# Patient Record
Sex: MALE | Race: WHITE | Employment: OTHER | URBAN - METROPOLITAN AREA
[De-identification: names, ages, dates, MRNs, and addresses within clinical notes are randomized per-mention and may not be internally consistent; named-entity substitution may affect disease eponyms.]

---

## 2017-02-07 ENCOUNTER — OFFICE VISIT (OUTPATIENT)
Dept: FAMILY MEDICINE CLINIC | Age: 72
End: 2017-02-07

## 2017-02-07 VITALS
HEART RATE: 86 BPM | BODY MASS INDEX: 35.56 KG/M2 | OXYGEN SATURATION: 95 % | SYSTOLIC BLOOD PRESSURE: 126 MMHG | TEMPERATURE: 98 F | DIASTOLIC BLOOD PRESSURE: 78 MMHG | WEIGHT: 254 LBS | HEIGHT: 71 IN

## 2017-02-07 DIAGNOSIS — M25.562 ACUTE PAIN OF LEFT KNEE: ICD-10-CM

## 2017-02-07 DIAGNOSIS — G89.29 CHRONIC MIDLINE LOW BACK PAIN WITHOUT SCIATICA: ICD-10-CM

## 2017-02-07 DIAGNOSIS — G25.0 BENIGN ESSENTIAL TREMOR: ICD-10-CM

## 2017-02-07 DIAGNOSIS — M54.50 CHRONIC MIDLINE LOW BACK PAIN WITHOUT SCIATICA: ICD-10-CM

## 2017-02-07 DIAGNOSIS — Z12.11 COLON CANCER SCREENING: ICD-10-CM

## 2017-02-07 DIAGNOSIS — I10 BENIGN ESSENTIAL HYPERTENSION: Primary | ICD-10-CM

## 2017-02-07 PROCEDURE — 1036F TOBACCO NON-USER: CPT | Performed by: FAMILY MEDICINE

## 2017-02-07 PROCEDURE — 3017F COLORECTAL CA SCREEN DOC REV: CPT | Performed by: FAMILY MEDICINE

## 2017-02-07 PROCEDURE — G8419 CALC BMI OUT NRM PARAM NOF/U: HCPCS | Performed by: FAMILY MEDICINE

## 2017-02-07 PROCEDURE — G8482 FLU IMMUNIZE ORDER/ADMIN: HCPCS | Performed by: FAMILY MEDICINE

## 2017-02-07 PROCEDURE — G8427 DOCREV CUR MEDS BY ELIG CLIN: HCPCS | Performed by: FAMILY MEDICINE

## 2017-02-07 PROCEDURE — 99214 OFFICE O/P EST MOD 30 MIN: CPT | Performed by: FAMILY MEDICINE

## 2017-02-07 PROCEDURE — 4040F PNEUMOC VAC/ADMIN/RCVD: CPT | Performed by: FAMILY MEDICINE

## 2017-02-07 PROCEDURE — 1123F ACP DISCUSS/DSCN MKR DOCD: CPT | Performed by: FAMILY MEDICINE

## 2017-02-20 RX ORDER — ATENOLOL 50 MG/1
50 TABLET ORAL DAILY
Qty: 30 TABLET | Refills: 3 | Status: SHIPPED | OUTPATIENT
Start: 2017-02-20 | End: 2017-04-10 | Stop reason: DRUGHIGH

## 2017-04-10 ENCOUNTER — OFFICE VISIT (OUTPATIENT)
Dept: FAMILY MEDICINE CLINIC | Age: 72
End: 2017-04-10

## 2017-04-10 VITALS
DIASTOLIC BLOOD PRESSURE: 70 MMHG | TEMPERATURE: 97.7 F | OXYGEN SATURATION: 96 % | SYSTOLIC BLOOD PRESSURE: 118 MMHG | HEART RATE: 79 BPM | WEIGHT: 251.6 LBS | BODY MASS INDEX: 35.22 KG/M2 | HEIGHT: 71 IN

## 2017-04-10 DIAGNOSIS — M15.9 PRIMARY OSTEOARTHRITIS INVOLVING MULTIPLE JOINTS: ICD-10-CM

## 2017-04-10 DIAGNOSIS — I45.10 RBBB: ICD-10-CM

## 2017-04-10 DIAGNOSIS — I10 BENIGN ESSENTIAL HYPERTENSION: Primary | ICD-10-CM

## 2017-04-10 DIAGNOSIS — G25.0 BENIGN ESSENTIAL TREMOR: ICD-10-CM

## 2017-04-10 PROCEDURE — G8427 DOCREV CUR MEDS BY ELIG CLIN: HCPCS | Performed by: FAMILY MEDICINE

## 2017-04-10 PROCEDURE — 1036F TOBACCO NON-USER: CPT | Performed by: FAMILY MEDICINE

## 2017-04-10 PROCEDURE — 3017F COLORECTAL CA SCREEN DOC REV: CPT | Performed by: FAMILY MEDICINE

## 2017-04-10 PROCEDURE — 4040F PNEUMOC VAC/ADMIN/RCVD: CPT | Performed by: FAMILY MEDICINE

## 2017-04-10 PROCEDURE — 1123F ACP DISCUSS/DSCN MKR DOCD: CPT | Performed by: FAMILY MEDICINE

## 2017-04-10 PROCEDURE — 99214 OFFICE O/P EST MOD 30 MIN: CPT | Performed by: FAMILY MEDICINE

## 2017-04-10 PROCEDURE — 93000 ELECTROCARDIOGRAM COMPLETE: CPT | Performed by: FAMILY MEDICINE

## 2017-04-10 PROCEDURE — G8417 CALC BMI ABV UP PARAM F/U: HCPCS | Performed by: FAMILY MEDICINE

## 2017-04-10 RX ORDER — ATENOLOL 50 MG/1
50 TABLET ORAL DAILY PRN
Qty: 1 TABLET | Refills: 0 | Status: SHIPPED
Start: 2017-04-10 | End: 2017-06-14 | Stop reason: ALTCHOICE

## 2017-04-20 ENCOUNTER — HOSPITAL ENCOUNTER (OUTPATIENT)
Dept: SURGERY | Age: 72
Discharge: HOME | End: 2017-04-20
Attending: OPHTHALMOLOGY | Admitting: OPHTHALMOLOGY

## 2017-04-20 VITALS
WEIGHT: 251.6 LBS | SYSTOLIC BLOOD PRESSURE: 141 MMHG | OXYGEN SATURATION: 97 % | HEART RATE: 58 BPM | HEIGHT: 71 IN | BODY MASS INDEX: 35.22 KG/M2 | TEMPERATURE: 98.4 F | RESPIRATION RATE: 16 BRPM | DIASTOLIC BLOOD PRESSURE: 70 MMHG

## 2017-04-20 RX ORDER — LIDOCAINE HYDROCHLORIDE 10 MG/ML
INJECTION, SOLUTION EPIDURAL; INFILTRATION; INTRACAUDAL; PERINEURAL
Status: DISPENSED
Start: 2017-04-20 | End: 2017-04-20

## 2017-04-20 RX ORDER — ONDANSETRON 2 MG/ML
4 INJECTION INTRAMUSCULAR; INTRAVENOUS PRN
Status: DISCONTINUED | OUTPATIENT
Start: 2017-04-20 | End: 2017-04-21 | Stop reason: HOSPADM

## 2017-04-20 RX ORDER — MEPERIDINE HYDROCHLORIDE 25 MG/ML
12.5 INJECTION INTRAMUSCULAR; INTRAVENOUS; SUBCUTANEOUS EVERY 5 MIN PRN
Status: DISCONTINUED | OUTPATIENT
Start: 2017-04-20 | End: 2017-04-21 | Stop reason: HOSPADM

## 2017-04-20 RX ORDER — MORPHINE SULFATE 4 MG/ML
2 INJECTION, SOLUTION INTRAMUSCULAR; INTRAVENOUS EVERY 5 MIN PRN
Status: DISCONTINUED | OUTPATIENT
Start: 2017-04-20 | End: 2017-04-21 | Stop reason: HOSPADM

## 2017-04-20 RX ORDER — HYDROMORPHONE HCL 110MG/55ML
0.5 PATIENT CONTROLLED ANALGESIA SYRINGE INTRAVENOUS EVERY 5 MIN PRN
Status: DISCONTINUED | OUTPATIENT
Start: 2017-04-20 | End: 2017-04-21 | Stop reason: HOSPADM

## 2017-04-20 RX ORDER — LABETALOL HYDROCHLORIDE 5 MG/ML
5 INJECTION, SOLUTION INTRAVENOUS EVERY 10 MIN PRN
Status: DISCONTINUED | OUTPATIENT
Start: 2017-04-20 | End: 2017-04-21 | Stop reason: HOSPADM

## 2017-04-20 RX ORDER — SODIUM CHLORIDE, SODIUM LACTATE, POTASSIUM CHLORIDE, CALCIUM CHLORIDE 600; 310; 30; 20 MG/100ML; MG/100ML; MG/100ML; MG/100ML
INJECTION, SOLUTION INTRAVENOUS CONTINUOUS
Status: DISCONTINUED | OUTPATIENT
Start: 2017-04-20 | End: 2017-04-21 | Stop reason: HOSPADM

## 2017-04-20 RX ORDER — HYDRALAZINE HYDROCHLORIDE 20 MG/ML
5 INJECTION INTRAMUSCULAR; INTRAVENOUS EVERY 10 MIN PRN
Status: DISCONTINUED | OUTPATIENT
Start: 2017-04-20 | End: 2017-04-21 | Stop reason: HOSPADM

## 2017-04-20 RX ORDER — MORPHINE SULFATE 4 MG/ML
1 INJECTION, SOLUTION INTRAMUSCULAR; INTRAVENOUS EVERY 5 MIN PRN
Status: DISCONTINUED | OUTPATIENT
Start: 2017-04-20 | End: 2017-04-21 | Stop reason: HOSPADM

## 2017-04-20 RX ORDER — OXYCODONE HYDROCHLORIDE AND ACETAMINOPHEN 5; 325 MG/1; MG/1
2 TABLET ORAL PRN
Status: ACTIVE | OUTPATIENT
Start: 2017-04-20 | End: 2017-04-20

## 2017-04-20 RX ORDER — OXYCODONE HYDROCHLORIDE AND ACETAMINOPHEN 5; 325 MG/1; MG/1
1 TABLET ORAL PRN
Status: ACTIVE | OUTPATIENT
Start: 2017-04-20 | End: 2017-04-20

## 2017-04-20 RX ORDER — HYDROMORPHONE HCL 110MG/55ML
0.25 PATIENT CONTROLLED ANALGESIA SYRINGE INTRAVENOUS EVERY 5 MIN PRN
Status: DISCONTINUED | OUTPATIENT
Start: 2017-04-20 | End: 2017-04-21 | Stop reason: HOSPADM

## 2017-04-20 RX ORDER — LIDOCAINE HYDROCHLORIDE 10 MG/ML
2 INJECTION, SOLUTION INFILTRATION; PERINEURAL
Status: COMPLETED | OUTPATIENT
Start: 2017-04-20 | End: 2017-04-20

## 2017-04-20 RX ORDER — PREDNISOLONE ACETATE 10 MG/ML
1 SUSPENSION/ DROPS OPHTHALMIC SEE ADMIN INSTRUCTIONS
Status: DISCONTINUED | OUTPATIENT
Start: 2017-04-20 | End: 2017-04-21 | Stop reason: HOSPADM

## 2017-04-20 RX ORDER — TOBRAMYCIN AND DEXAMETHASONE 3; 1 MG/ML; MG/ML
1 SUSPENSION/ DROPS OPHTHALMIC SEE ADMIN INSTRUCTIONS
Status: DISCONTINUED | OUTPATIENT
Start: 2017-04-20 | End: 2017-04-21 | Stop reason: HOSPADM

## 2017-04-20 RX ORDER — PROMETHAZINE HYDROCHLORIDE 25 MG/ML
6.25 INJECTION, SOLUTION INTRAMUSCULAR; INTRAVENOUS
Status: DISCONTINUED | OUTPATIENT
Start: 2017-04-20 | End: 2017-04-21 | Stop reason: HOSPADM

## 2017-04-20 RX ORDER — DIPHENHYDRAMINE HYDROCHLORIDE 50 MG/ML
12.5 INJECTION INTRAMUSCULAR; INTRAVENOUS
Status: ACTIVE | OUTPATIENT
Start: 2017-04-20 | End: 2017-04-20

## 2017-04-20 RX ADMIN — SODIUM CHLORIDE, SODIUM LACTATE, POTASSIUM CHLORIDE, CALCIUM CHLORIDE: 600; 310; 30; 20 INJECTION, SOLUTION INTRAVENOUS at 08:30

## 2017-04-20 RX ADMIN — LIDOCAINE HYDROCHLORIDE 2 ML: 10 INJECTION, SOLUTION INFILTRATION; PERINEURAL at 07:37

## 2017-04-20 ASSESSMENT — PAIN SCALES - GENERAL: PAINLEVEL_OUTOF10: 0

## 2017-04-20 ASSESSMENT — PAIN - FUNCTIONAL ASSESSMENT: PAIN_FUNCTIONAL_ASSESSMENT: 0-10

## 2017-05-05 ENCOUNTER — TELEPHONE (OUTPATIENT)
Dept: FAMILY MEDICINE CLINIC | Age: 72
End: 2017-05-05

## 2017-05-09 RX ORDER — TOBRAMYCIN AND DEXAMETHASONE 3; 1 MG/ML; MG/ML
1 SUSPENSION/ DROPS OPHTHALMIC SEE ADMIN INSTRUCTIONS
Status: CANCELLED | OUTPATIENT
Start: 2017-05-18

## 2017-05-09 RX ORDER — PREDNISOLONE ACETATE 10 MG/ML
1 SUSPENSION/ DROPS OPHTHALMIC SEE ADMIN INSTRUCTIONS
Status: CANCELLED | OUTPATIENT
Start: 2017-05-18

## 2017-05-15 ENCOUNTER — INITIAL CONSULT (OUTPATIENT)
Dept: SURGERY | Age: 72
End: 2017-05-15

## 2017-05-15 VITALS
DIASTOLIC BLOOD PRESSURE: 68 MMHG | HEIGHT: 71 IN | WEIGHT: 253 LBS | BODY MASS INDEX: 35.42 KG/M2 | SYSTOLIC BLOOD PRESSURE: 138 MMHG

## 2017-05-15 DIAGNOSIS — K43.6 INCARCERATED VENTRAL HERNIA: Primary | ICD-10-CM

## 2017-05-15 PROCEDURE — 1123F ACP DISCUSS/DSCN MKR DOCD: CPT | Performed by: SURGERY

## 2017-05-15 PROCEDURE — 3017F COLORECTAL CA SCREEN DOC REV: CPT | Performed by: SURGERY

## 2017-05-15 PROCEDURE — 1036F TOBACCO NON-USER: CPT | Performed by: SURGERY

## 2017-05-15 PROCEDURE — G8417 CALC BMI ABV UP PARAM F/U: HCPCS | Performed by: SURGERY

## 2017-05-15 PROCEDURE — 99203 OFFICE O/P NEW LOW 30 MIN: CPT | Performed by: SURGERY

## 2017-05-15 PROCEDURE — G8427 DOCREV CUR MEDS BY ELIG CLIN: HCPCS | Performed by: SURGERY

## 2017-05-15 PROCEDURE — 4040F PNEUMOC VAC/ADMIN/RCVD: CPT | Performed by: SURGERY

## 2017-05-15 RX ORDER — LIDOCAINE HYDROCHLORIDE 10 MG/ML
0.3 INJECTION, SOLUTION EPIDURAL; INFILTRATION; INTRACAUDAL; PERINEURAL
Status: CANCELLED | OUTPATIENT
Start: 2017-05-15 | End: 2017-05-15

## 2017-05-15 RX ORDER — SODIUM CHLORIDE 0.9 % (FLUSH) 0.9 %
10 SYRINGE (ML) INJECTION PRN
Status: CANCELLED | OUTPATIENT
Start: 2017-05-15

## 2017-05-15 RX ORDER — SODIUM CHLORIDE, SODIUM LACTATE, POTASSIUM CHLORIDE, CALCIUM CHLORIDE 600; 310; 30; 20 MG/100ML; MG/100ML; MG/100ML; MG/100ML
INJECTION, SOLUTION INTRAVENOUS CONTINUOUS
Status: CANCELLED | OUTPATIENT
Start: 2017-05-15

## 2017-05-15 RX ORDER — SODIUM CHLORIDE 0.9 % (FLUSH) 0.9 %
10 SYRINGE (ML) INJECTION EVERY 12 HOURS SCHEDULED
Status: CANCELLED | OUTPATIENT
Start: 2017-05-15

## 2017-05-16 ENCOUNTER — TELEPHONE (OUTPATIENT)
Dept: SURGERY | Age: 72
End: 2017-05-16

## 2017-05-18 ENCOUNTER — HOSPITAL ENCOUNTER (OUTPATIENT)
Dept: SURGERY | Age: 72
Discharge: HOME | End: 2017-05-18
Attending: OPHTHALMOLOGY | Admitting: OPHTHALMOLOGY

## 2017-05-23 VITALS — HEIGHT: 71 IN | WEIGHT: 253 LBS | BODY MASS INDEX: 35.42 KG/M2

## 2017-05-30 ENCOUNTER — HOSPITAL ENCOUNTER (OUTPATIENT)
Dept: SURGERY | Age: 72
Discharge: HOME | End: 2017-05-18
Attending: SURGERY | Admitting: SURGERY

## 2017-05-30 ENCOUNTER — HOSPITAL ENCOUNTER (OUTPATIENT)
Dept: SURGERY | Age: 72
Discharge: HOME | End: 2017-05-30
Attending: SURGERY | Admitting: SURGERY

## 2017-05-30 VITALS
SYSTOLIC BLOOD PRESSURE: 137 MMHG | DIASTOLIC BLOOD PRESSURE: 81 MMHG | TEMPERATURE: 97.6 F | HEART RATE: 80 BPM | OXYGEN SATURATION: 94 % | RESPIRATION RATE: 16 BRPM

## 2017-05-30 PROCEDURE — 49561 PR REPAIR INCISIONAL HERNIA,STRANG: CPT | Performed by: SURGERY

## 2017-05-30 PROCEDURE — 49568 PR IMPLANT MESH HERNIA REPAIR/DEBRIDEMENT CLOSURE: CPT | Performed by: SURGERY

## 2017-05-30 RX ORDER — SODIUM CHLORIDE 0.9 % (FLUSH) 0.9 %
10 SYRINGE (ML) INJECTION EVERY 12 HOURS SCHEDULED
Status: DISCONTINUED | OUTPATIENT
Start: 2017-05-30 | End: 2017-05-30 | Stop reason: SDUPTHER

## 2017-05-30 RX ORDER — PREDNISOLONE ACETATE 10 MG/ML
1 SUSPENSION/ DROPS OPHTHALMIC SEE ADMIN INSTRUCTIONS
Status: DISCONTINUED | OUTPATIENT
Start: 2017-05-30 | End: 2017-05-30

## 2017-05-30 RX ORDER — SODIUM CHLORIDE 0.9 % (FLUSH) 0.9 %
10 SYRINGE (ML) INJECTION EVERY 12 HOURS SCHEDULED
Status: DISCONTINUED | OUTPATIENT
Start: 2017-05-30 | End: 2017-05-31 | Stop reason: HOSPADM

## 2017-05-30 RX ORDER — MEPERIDINE HYDROCHLORIDE 25 MG/ML
12.5 INJECTION INTRAMUSCULAR; INTRAVENOUS; SUBCUTANEOUS EVERY 5 MIN PRN
Status: DISCONTINUED | OUTPATIENT
Start: 2017-05-30 | End: 2017-05-31 | Stop reason: HOSPADM

## 2017-05-30 RX ORDER — OXYCODONE HYDROCHLORIDE 5 MG/1
TABLET ORAL
Qty: 40 TABLET | Refills: 0 | Status: SHIPPED | OUTPATIENT
Start: 2017-05-30 | End: 2017-06-14

## 2017-05-30 RX ORDER — OXYCODONE HYDROCHLORIDE AND ACETAMINOPHEN 5; 325 MG/1; MG/1
1 TABLET ORAL PRN
Status: COMPLETED | OUTPATIENT
Start: 2017-05-30 | End: 2017-05-30

## 2017-05-30 RX ORDER — HEPARIN SODIUM 5000 [USP'U]/ML
5000 INJECTION, SOLUTION INTRAVENOUS; SUBCUTANEOUS EVERY 8 HOURS SCHEDULED
Status: DISCONTINUED | OUTPATIENT
Start: 2017-05-30 | End: 2017-05-31 | Stop reason: HOSPADM

## 2017-05-30 RX ORDER — HYDROMORPHONE HCL 110MG/55ML
0.25 PATIENT CONTROLLED ANALGESIA SYRINGE INTRAVENOUS EVERY 5 MIN PRN
Status: DISCONTINUED | OUTPATIENT
Start: 2017-05-30 | End: 2017-05-31 | Stop reason: HOSPADM

## 2017-05-30 RX ORDER — TOBRAMYCIN AND DEXAMETHASONE 3; 1 MG/ML; MG/ML
1 SUSPENSION/ DROPS OPHTHALMIC SEE ADMIN INSTRUCTIONS
Status: DISCONTINUED | OUTPATIENT
Start: 2017-05-30 | End: 2017-05-30

## 2017-05-30 RX ORDER — ONDANSETRON 2 MG/ML
4 INJECTION INTRAMUSCULAR; INTRAVENOUS EVERY 10 MIN PRN
Status: DISCONTINUED | OUTPATIENT
Start: 2017-05-30 | End: 2017-05-31 | Stop reason: HOSPADM

## 2017-05-30 RX ORDER — LABETALOL HYDROCHLORIDE 5 MG/ML
5 INJECTION, SOLUTION INTRAVENOUS EVERY 10 MIN PRN
Status: DISCONTINUED | OUTPATIENT
Start: 2017-05-30 | End: 2017-05-31 | Stop reason: HOSPADM

## 2017-05-30 RX ORDER — LIDOCAINE HYDROCHLORIDE 10 MG/ML
0.1 INJECTION, SOLUTION EPIDURAL; INFILTRATION; INTRACAUDAL; PERINEURAL ONCE
Status: COMPLETED | OUTPATIENT
Start: 2017-05-30 | End: 2017-05-30

## 2017-05-30 RX ORDER — OXYCODONE HYDROCHLORIDE AND ACETAMINOPHEN 5; 325 MG/1; MG/1
2 TABLET ORAL PRN
Status: COMPLETED | OUTPATIENT
Start: 2017-05-30 | End: 2017-05-30

## 2017-05-30 RX ORDER — SODIUM CHLORIDE 0.9 % (FLUSH) 0.9 %
10 SYRINGE (ML) INJECTION PRN
Status: DISCONTINUED | OUTPATIENT
Start: 2017-05-30 | End: 2017-05-30 | Stop reason: SDUPTHER

## 2017-05-30 RX ORDER — SODIUM CHLORIDE, SODIUM LACTATE, POTASSIUM CHLORIDE, CALCIUM CHLORIDE 600; 310; 30; 20 MG/100ML; MG/100ML; MG/100ML; MG/100ML
INJECTION, SOLUTION INTRAVENOUS CONTINUOUS
Status: DISCONTINUED | OUTPATIENT
Start: 2017-05-30 | End: 2017-05-30 | Stop reason: SDUPTHER

## 2017-05-30 RX ORDER — SODIUM CHLORIDE, SODIUM LACTATE, POTASSIUM CHLORIDE, CALCIUM CHLORIDE 600; 310; 30; 20 MG/100ML; MG/100ML; MG/100ML; MG/100ML
INJECTION, SOLUTION INTRAVENOUS CONTINUOUS
Status: DISCONTINUED | OUTPATIENT
Start: 2017-05-30 | End: 2017-05-31 | Stop reason: HOSPADM

## 2017-05-30 RX ORDER — HYDROMORPHONE HCL 110MG/55ML
0.5 PATIENT CONTROLLED ANALGESIA SYRINGE INTRAVENOUS EVERY 5 MIN PRN
Status: DISCONTINUED | OUTPATIENT
Start: 2017-05-30 | End: 2017-05-31 | Stop reason: HOSPADM

## 2017-05-30 RX ORDER — LIDOCAINE HYDROCHLORIDE 10 MG/ML
0.3 INJECTION, SOLUTION EPIDURAL; INFILTRATION; INTRACAUDAL; PERINEURAL
Status: DISCONTINUED | OUTPATIENT
Start: 2017-05-30 | End: 2017-05-30 | Stop reason: SDUPTHER

## 2017-05-30 RX ORDER — HYDRALAZINE HYDROCHLORIDE 20 MG/ML
5 INJECTION INTRAMUSCULAR; INTRAVENOUS EVERY 10 MIN PRN
Status: DISCONTINUED | OUTPATIENT
Start: 2017-05-30 | End: 2017-05-31 | Stop reason: HOSPADM

## 2017-05-30 RX ORDER — SODIUM CHLORIDE 0.9 % (FLUSH) 0.9 %
10 SYRINGE (ML) INJECTION PRN
Status: DISCONTINUED | OUTPATIENT
Start: 2017-05-30 | End: 2017-05-31 | Stop reason: HOSPADM

## 2017-05-30 RX ADMIN — SODIUM CHLORIDE, SODIUM LACTATE, POTASSIUM CHLORIDE, CALCIUM CHLORIDE: 600; 310; 30; 20 INJECTION, SOLUTION INTRAVENOUS at 08:22

## 2017-05-30 RX ADMIN — LIDOCAINE HYDROCHLORIDE 0.1 ML: 10 INJECTION, SOLUTION EPIDURAL; INFILTRATION; INTRACAUDAL; PERINEURAL at 08:22

## 2017-05-30 RX ADMIN — HEPARIN SODIUM 5000 UNITS: 5000 INJECTION, SOLUTION INTRAVENOUS; SUBCUTANEOUS at 08:24

## 2017-05-30 RX ADMIN — Medication 0.5 MG: at 11:05

## 2017-05-30 RX ADMIN — OXYCODONE HYDROCHLORIDE AND ACETAMINOPHEN 1 TABLET: 5; 325 TABLET ORAL at 11:34

## 2017-05-30 RX ADMIN — Medication 0.5 MG: at 11:00

## 2017-05-30 RX ADMIN — ONDANSETRON 4 MG: 2 INJECTION INTRAMUSCULAR; INTRAVENOUS at 10:59

## 2017-05-30 ASSESSMENT — PAIN SCALES - GENERAL
PAINLEVEL_OUTOF10: 0
PAINLEVEL_OUTOF10: 2
PAINLEVEL_OUTOF10: 7
PAINLEVEL_OUTOF10: 4
PAINLEVEL_OUTOF10: 7

## 2017-05-30 ASSESSMENT — PAIN DESCRIPTION - LOCATION
LOCATION: ABDOMEN

## 2017-05-30 ASSESSMENT — PAIN - FUNCTIONAL ASSESSMENT: PAIN_FUNCTIONAL_ASSESSMENT: 0-10

## 2017-05-30 ASSESSMENT — PAIN DESCRIPTION - PAIN TYPE
TYPE: SURGICAL PAIN

## 2017-06-14 ENCOUNTER — OFFICE VISIT (OUTPATIENT)
Dept: SURGERY | Age: 72
End: 2017-06-14

## 2017-06-14 VITALS
DIASTOLIC BLOOD PRESSURE: 84 MMHG | BODY MASS INDEX: 35.7 KG/M2 | WEIGHT: 255 LBS | HEIGHT: 71 IN | SYSTOLIC BLOOD PRESSURE: 134 MMHG

## 2017-06-14 DIAGNOSIS — Z09 SURGERY FOLLOW-UP EXAMINATION: Primary | ICD-10-CM

## 2017-06-14 PROCEDURE — 99024 POSTOP FOLLOW-UP VISIT: CPT | Performed by: SURGERY

## 2017-06-29 ENCOUNTER — HOSPITAL ENCOUNTER (OUTPATIENT)
Dept: SURGERY | Age: 72
Discharge: HOME | End: 2017-06-29
Attending: OPHTHALMOLOGY | Admitting: OPHTHALMOLOGY

## 2017-06-29 VITALS
WEIGHT: 255 LBS | RESPIRATION RATE: 16 BRPM | OXYGEN SATURATION: 98 % | TEMPERATURE: 97.4 F | HEIGHT: 71 IN | BODY MASS INDEX: 35.7 KG/M2 | SYSTOLIC BLOOD PRESSURE: 145 MMHG | HEART RATE: 62 BPM | DIASTOLIC BLOOD PRESSURE: 88 MMHG

## 2017-06-29 RX ORDER — PREDNISOLONE ACETATE 10 MG/ML
1 SUSPENSION/ DROPS OPHTHALMIC SEE ADMIN INSTRUCTIONS
Status: DISCONTINUED | OUTPATIENT
Start: 2017-06-29 | End: 2017-06-30 | Stop reason: HOSPADM

## 2017-06-29 RX ORDER — LIDOCAINE HYDROCHLORIDE 10 MG/ML
1 INJECTION, SOLUTION EPIDURAL; INFILTRATION; INTRACAUDAL; PERINEURAL
Status: COMPLETED | OUTPATIENT
Start: 2017-06-29 | End: 2017-06-29

## 2017-06-29 RX ORDER — TOBRAMYCIN AND DEXAMETHASONE 3; 1 MG/ML; MG/ML
1 SUSPENSION/ DROPS OPHTHALMIC SEE ADMIN INSTRUCTIONS
Status: DISCONTINUED | OUTPATIENT
Start: 2017-06-29 | End: 2017-06-30 | Stop reason: HOSPADM

## 2017-06-29 RX ORDER — SODIUM CHLORIDE, SODIUM LACTATE, POTASSIUM CHLORIDE, CALCIUM CHLORIDE 600; 310; 30; 20 MG/100ML; MG/100ML; MG/100ML; MG/100ML
INJECTION, SOLUTION INTRAVENOUS CONTINUOUS
Status: DISCONTINUED | OUTPATIENT
Start: 2017-06-29 | End: 2017-06-30 | Stop reason: HOSPADM

## 2017-06-29 RX ADMIN — LIDOCAINE HYDROCHLORIDE 1 ML: 10 INJECTION, SOLUTION EPIDURAL; INFILTRATION; INTRACAUDAL; PERINEURAL at 11:03

## 2017-06-29 RX ADMIN — TOBRAMYCIN AND DEXAMETHASONE 1 DROP: 3; 1 SUSPENSION/ DROPS OPHTHALMIC at 14:21

## 2017-06-29 RX ADMIN — SODIUM CHLORIDE, SODIUM LACTATE, POTASSIUM CHLORIDE, CALCIUM CHLORIDE: 600; 310; 30; 20 INJECTION, SOLUTION INTRAVENOUS at 11:03

## 2017-06-29 ASSESSMENT — PAIN - FUNCTIONAL ASSESSMENT: PAIN_FUNCTIONAL_ASSESSMENT: 0-10

## 2017-06-29 ASSESSMENT — PAIN SCALES - GENERAL
PAINLEVEL_OUTOF10: 0
PAINLEVEL_OUTOF10: 0

## 2017-08-01 RX ORDER — GABAPENTIN 300 MG/1
CAPSULE ORAL
Qty: 90 CAPSULE | Refills: 0 | OUTPATIENT
Start: 2017-08-01

## 2018-01-01 ENCOUNTER — OFFICE VISIT (OUTPATIENT)
Dept: FAMILY MEDICINE CLINIC | Age: 73
End: 2018-01-01

## 2018-01-01 ENCOUNTER — TELEPHONE (OUTPATIENT)
Dept: FAMILY MEDICINE CLINIC | Age: 73
End: 2018-01-01

## 2018-01-01 ENCOUNTER — HOSPITAL ENCOUNTER (OUTPATIENT)
Dept: CT IMAGING | Age: 73
End: 2018-01-01
Payer: MEDICARE

## 2018-01-01 ENCOUNTER — HOSPITAL ENCOUNTER (OUTPATIENT)
Dept: GENERAL RADIOLOGY | Age: 73
Discharge: HOME | End: 2018-07-14
Payer: MEDICARE

## 2018-01-01 ENCOUNTER — HOSPITAL ENCOUNTER (INPATIENT)
Age: 73
LOS: 3 days | Discharge: HOSPICE | DRG: 813 | End: 2018-08-03
Attending: INTERNAL MEDICINE | Admitting: INTERNAL MEDICINE
Payer: MEDICARE

## 2018-01-01 ENCOUNTER — HOSPITAL ENCOUNTER (EMERGENCY)
Age: 73
Discharge: HOME | End: 2018-07-26
Attending: EMERGENCY MEDICINE
Payer: MEDICARE

## 2018-01-01 ENCOUNTER — APPOINTMENT (OUTPATIENT)
Dept: CT IMAGING | Age: 73
End: 2018-01-01
Payer: MEDICARE

## 2018-01-01 ENCOUNTER — APPOINTMENT (OUTPATIENT)
Dept: GENERAL RADIOLOGY | Age: 73
End: 2018-01-01
Payer: MEDICARE

## 2018-01-01 ENCOUNTER — TELEPHONE (OUTPATIENT)
Dept: ORTHOPEDIC SURGERY | Age: 73
End: 2018-01-01

## 2018-01-01 ENCOUNTER — HOSPITAL ENCOUNTER (OUTPATIENT)
Dept: CT IMAGING | Age: 73
Discharge: HOME | DRG: 813 | End: 2018-07-31
Payer: MEDICARE

## 2018-01-01 ENCOUNTER — HOSPITAL ENCOUNTER (OUTPATIENT)
Dept: PET IMAGING | Age: 73
Discharge: HOME | End: 2018-07-26
Payer: MEDICARE

## 2018-01-01 ENCOUNTER — OFFICE VISIT (OUTPATIENT)
Dept: SURGERY | Age: 73
End: 2018-01-01

## 2018-01-01 ENCOUNTER — HOSPITAL ENCOUNTER (OUTPATIENT)
Dept: ULTRASOUND IMAGING | Age: 73
Discharge: HOME | End: 2018-07-07
Attending: FAMILY MEDICINE | Admitting: FAMILY MEDICINE

## 2018-01-01 ENCOUNTER — HOSPITAL ENCOUNTER (OUTPATIENT)
Dept: CT IMAGING | Age: 73
Discharge: HOME | End: 2018-07-14
Payer: MEDICARE

## 2018-01-01 ENCOUNTER — APPOINTMENT (OUTPATIENT)
Dept: CT IMAGING | Age: 73
DRG: 813 | End: 2018-01-01
Attending: INTERNAL MEDICINE
Payer: MEDICARE

## 2018-01-01 ENCOUNTER — HOSPITAL ENCOUNTER (OUTPATIENT)
Dept: CT IMAGING | Age: 73
Discharge: HOME | End: 2018-07-17
Payer: MEDICARE

## 2018-01-01 VITALS
OXYGEN SATURATION: 95 % | TEMPERATURE: 98.2 F | SYSTOLIC BLOOD PRESSURE: 128 MMHG | HEART RATE: 80 BPM | HEIGHT: 71 IN | DIASTOLIC BLOOD PRESSURE: 74 MMHG | WEIGHT: 238 LBS | BODY MASS INDEX: 33.32 KG/M2

## 2018-01-01 VITALS
BODY MASS INDEX: 29.53 KG/M2 | DIASTOLIC BLOOD PRESSURE: 79 MMHG | HEIGHT: 72 IN | SYSTOLIC BLOOD PRESSURE: 124 MMHG | TEMPERATURE: 95.6 F | WEIGHT: 218 LBS | HEART RATE: 100 BPM | OXYGEN SATURATION: 94 % | RESPIRATION RATE: 16 BRPM

## 2018-01-01 VITALS
OXYGEN SATURATION: 95 % | HEART RATE: 87 BPM | WEIGHT: 229 LBS | DIASTOLIC BLOOD PRESSURE: 78 MMHG | SYSTOLIC BLOOD PRESSURE: 134 MMHG | BODY MASS INDEX: 31.94 KG/M2 | TEMPERATURE: 98.3 F

## 2018-01-01 VITALS
DIASTOLIC BLOOD PRESSURE: 68 MMHG | HEART RATE: 80 BPM | TEMPERATURE: 97.9 F | BODY MASS INDEX: 32.36 KG/M2 | OXYGEN SATURATION: 95 % | WEIGHT: 232 LBS | SYSTOLIC BLOOD PRESSURE: 124 MMHG

## 2018-01-01 VITALS
WEIGHT: 218 LBS | RESPIRATION RATE: 12 BRPM | HEIGHT: 72 IN | HEART RATE: 100 BPM | OXYGEN SATURATION: 96 % | SYSTOLIC BLOOD PRESSURE: 144 MMHG | TEMPERATURE: 97.8 F | BODY MASS INDEX: 29.53 KG/M2 | DIASTOLIC BLOOD PRESSURE: 77 MMHG

## 2018-01-01 VITALS
TEMPERATURE: 97.7 F | BODY MASS INDEX: 29.57 KG/M2 | HEART RATE: 104 BPM | SYSTOLIC BLOOD PRESSURE: 120 MMHG | WEIGHT: 218 LBS | DIASTOLIC BLOOD PRESSURE: 74 MMHG | OXYGEN SATURATION: 96 %

## 2018-01-01 VITALS
SYSTOLIC BLOOD PRESSURE: 110 MMHG | HEIGHT: 71 IN | BODY MASS INDEX: 30.8 KG/M2 | DIASTOLIC BLOOD PRESSURE: 64 MMHG | WEIGHT: 220 LBS

## 2018-01-01 VITALS
TEMPERATURE: 97.4 F | DIASTOLIC BLOOD PRESSURE: 58 MMHG | WEIGHT: 221 LBS | BODY MASS INDEX: 30.82 KG/M2 | OXYGEN SATURATION: 95 % | SYSTOLIC BLOOD PRESSURE: 112 MMHG | HEART RATE: 104 BPM

## 2018-01-01 VITALS — HEIGHT: 72 IN | WEIGHT: 218 LBS | BODY MASS INDEX: 29.53 KG/M2

## 2018-01-01 VITALS
OXYGEN SATURATION: 96 % | BODY MASS INDEX: 30.54 KG/M2 | DIASTOLIC BLOOD PRESSURE: 70 MMHG | SYSTOLIC BLOOD PRESSURE: 116 MMHG | TEMPERATURE: 97.4 F | HEART RATE: 114 BPM | WEIGHT: 219 LBS

## 2018-01-01 DIAGNOSIS — R79.89 ELEVATED LFTS: ICD-10-CM

## 2018-01-01 DIAGNOSIS — R10.84 GENERALIZED ABDOMINAL PAIN: ICD-10-CM

## 2018-01-01 DIAGNOSIS — R06.09 DYSPNEA ON EXERTION: Primary | ICD-10-CM

## 2018-01-01 DIAGNOSIS — B96.89 ACUTE BACTERIAL SINUSITIS: Primary | ICD-10-CM

## 2018-01-01 DIAGNOSIS — R10.11 RUQ ABDOMINAL PAIN: ICD-10-CM

## 2018-01-01 DIAGNOSIS — R10.13 EPIGASTRIC PAIN: Primary | ICD-10-CM

## 2018-01-01 DIAGNOSIS — C79.9 METASTATIC DISEASE (HCC): Primary | ICD-10-CM

## 2018-01-01 DIAGNOSIS — K80.50 BILIARY COLIC: Primary | ICD-10-CM

## 2018-01-01 DIAGNOSIS — R10.13 EPIGASTRIC PAIN: ICD-10-CM

## 2018-01-01 DIAGNOSIS — C78.7 LIVER METASTASES (HCC): ICD-10-CM

## 2018-01-01 DIAGNOSIS — B96.89 ACUTE BACTERIAL SINUSITIS: ICD-10-CM

## 2018-01-01 DIAGNOSIS — J01.90 ACUTE BACTERIAL SINUSITIS: ICD-10-CM

## 2018-01-01 DIAGNOSIS — C78.7 METASTASIS TO LIVER (HCC): ICD-10-CM

## 2018-01-01 DIAGNOSIS — R06.09 DYSPNEA ON EXERTION: ICD-10-CM

## 2018-01-01 DIAGNOSIS — K76.89 NODULE ON LIVER: Primary | ICD-10-CM

## 2018-01-01 DIAGNOSIS — R13.10 DYSPHAGIA, UNSPECIFIED TYPE: ICD-10-CM

## 2018-01-01 DIAGNOSIS — R10.9 ABDOMINAL PAIN, UNSPECIFIED ABDOMINAL LOCATION: Primary | ICD-10-CM

## 2018-01-01 DIAGNOSIS — M54.50 ACUTE MIDLINE LOW BACK PAIN WITHOUT SCIATICA: ICD-10-CM

## 2018-01-01 DIAGNOSIS — R10.10 UPPER ABDOMINAL PAIN: Primary | ICD-10-CM

## 2018-01-01 DIAGNOSIS — C78.7 LIVER METASTASES (HCC): Primary | ICD-10-CM

## 2018-01-01 DIAGNOSIS — Z12.5 PROSTATE CANCER SCREENING: ICD-10-CM

## 2018-01-01 DIAGNOSIS — D72.829 LEUKOCYTOSIS, UNSPECIFIED TYPE: ICD-10-CM

## 2018-01-01 DIAGNOSIS — R31.9 HEMATURIA, UNSPECIFIED TYPE: Primary | ICD-10-CM

## 2018-01-01 DIAGNOSIS — R10.13 EPIGASTRIC ABDOMINAL PAIN: Primary | ICD-10-CM

## 2018-01-01 DIAGNOSIS — K76.89 NODULE ON LIVER: ICD-10-CM

## 2018-01-01 DIAGNOSIS — R16.0 LIVER MASSES: ICD-10-CM

## 2018-01-01 DIAGNOSIS — J01.90 ACUTE BACTERIAL SINUSITIS: Primary | ICD-10-CM

## 2018-01-01 LAB
A/G RATIO: 0.5 (ref 1.1–2.2)
A/G RATIO: 0.6 (ref 1.1–2.2)
A/G RATIO: 0.8 (ref 1.1–2.2)
A/G RATIO: 0.9 (ref 1.1–2.2)
A/G RATIO: 1.2 (ref 1.1–2.2)
ABO/RH: NORMAL
ALBUMIN SERPL-MCNC: 2.2 G/DL (ref 3.4–5)
ALBUMIN SERPL-MCNC: 2.6 G/DL (ref 3.4–5)
ALBUMIN SERPL-MCNC: 2.7 G/DL (ref 3.4–5)
ALBUMIN SERPL-MCNC: 2.7 G/DL (ref 3.4–5)
ALBUMIN SERPL-MCNC: 3.7 G/DL (ref 3.4–5)
ALP BLD-CCNC: 381 U/L (ref 40–129)
ALP BLD-CCNC: 518 U/L (ref 40–129)
ALP BLD-CCNC: 539 U/L (ref 40–129)
ALP BLD-CCNC: 610 U/L (ref 40–129)
ALP BLD-CCNC: 627 U/L (ref 40–129)
ALT SERPL-CCNC: 119 U/L (ref 10–40)
ALT SERPL-CCNC: 132 U/L (ref 10–40)
ALT SERPL-CCNC: 132 U/L (ref 10–40)
ALT SERPL-CCNC: 155 U/L (ref 10–40)
ALT SERPL-CCNC: 58 U/L (ref 10–40)
AMMONIA: 53 UMOL/L (ref 16–60)
AMYLASE: 26 U/L (ref 25–115)
ANION GAP SERPL CALCULATED.3IONS-SCNC: 13 MMOL/L (ref 3–16)
ANION GAP SERPL CALCULATED.3IONS-SCNC: 15 MMOL/L (ref 3–16)
ANION GAP SERPL CALCULATED.3IONS-SCNC: 16 MMOL/L (ref 3–16)
ANION GAP SERPL CALCULATED.3IONS-SCNC: 20 MMOL/L (ref 3–16)
ANION GAP SERPL CALCULATED.3IONS-SCNC: 22 MMOL/L (ref 3–16)
ANISOCYTOSIS: ABNORMAL
ANISOCYTOSIS: ABNORMAL
ANTIBODY SCREEN: NORMAL
AST SERPL-CCNC: 157 U/L (ref 15–37)
AST SERPL-CCNC: 203 U/L (ref 15–37)
AST SERPL-CCNC: 219 U/L (ref 15–37)
AST SERPL-CCNC: 240 U/L (ref 15–37)
AST SERPL-CCNC: 50 U/L (ref 15–37)
BANDED NEUTROPHILS RELATIVE PERCENT: 10 % (ref 0–7)
BANDED NEUTROPHILS RELATIVE PERCENT: 20 % (ref 0–7)
BANDED NEUTROPHILS RELATIVE PERCENT: 5 % (ref 0–7)
BASOPHILS ABSOLUTE: 0 K/UL (ref 0–0.2)
BASOPHILS ABSOLUTE: 0.2 K/UL (ref 0–0.2)
BASOPHILS RELATIVE PERCENT: 0 %
BASOPHILS RELATIVE PERCENT: 1.2 %
BILIRUB SERPL-MCNC: 0.6 MG/DL (ref 0–1)
BILIRUB SERPL-MCNC: 12.1 MG/DL (ref 0–1)
BILIRUB SERPL-MCNC: 13.9 MG/DL (ref 0–1)
BILIRUB SERPL-MCNC: 15.6 MG/DL (ref 0–1)
BILIRUB SERPL-MCNC: 9.8 MG/DL (ref 0–1)
BILIRUBIN, POC: ABNORMAL
BLOOD BANK DISPENSE STATUS: NORMAL
BLOOD BANK PRODUCT CODE: NORMAL
BLOOD URINE, POC: NEGATIVE
BPU ID: NORMAL
BUN BLDV-MCNC: 12 MG/DL (ref 7–20)
BUN BLDV-MCNC: 19 MG/DL (ref 7–20)
BUN BLDV-MCNC: 32 MG/DL (ref 7–20)
BUN BLDV-MCNC: 34 MG/DL (ref 7–20)
BUN BLDV-MCNC: 45 MG/DL (ref 7–20)
CALCIUM SERPL-MCNC: 10 MG/DL (ref 8.3–10.6)
CALCIUM SERPL-MCNC: 10.3 MG/DL (ref 8.3–10.6)
CALCIUM SERPL-MCNC: 9.2 MG/DL (ref 8.3–10.6)
CALCIUM SERPL-MCNC: 9.5 MG/DL (ref 8.3–10.6)
CALCIUM SERPL-MCNC: 9.9 MG/DL (ref 8.3–10.6)
CHLORIDE BLD-SCNC: 91 MMOL/L (ref 99–110)
CHLORIDE BLD-SCNC: 92 MMOL/L (ref 99–110)
CHLORIDE BLD-SCNC: 92 MMOL/L (ref 99–110)
CHLORIDE BLD-SCNC: 94 MMOL/L (ref 99–110)
CHLORIDE BLD-SCNC: 97 MMOL/L (ref 99–110)
CLARITY, POC: ABNORMAL
CO2: 19 MMOL/L (ref 21–32)
CO2: 19 MMOL/L (ref 21–32)
CO2: 24 MMOL/L (ref 21–32)
CO2: 26 MMOL/L (ref 21–32)
CO2: 28 MMOL/L (ref 21–32)
COLOR, POC: ABNORMAL
CREAT SERPL-MCNC: 0.7 MG/DL (ref 0.8–1.3)
CREAT SERPL-MCNC: 0.7 MG/DL (ref 0.8–1.3)
CREAT SERPL-MCNC: 0.9 MG/DL (ref 0.8–1.3)
DESCRIPTION BLOOD BANK: NORMAL
EOSINOPHILS ABSOLUTE: 1.1 K/UL (ref 0–0.6)
EOSINOPHILS ABSOLUTE: 1.6 K/UL (ref 0–0.6)
EOSINOPHILS ABSOLUTE: 1.8 K/UL (ref 0–0.6)
EOSINOPHILS ABSOLUTE: 6 K/UL (ref 0–0.6)
EOSINOPHILS RELATIVE PERCENT: 17 %
EOSINOPHILS RELATIVE PERCENT: 5 %
EOSINOPHILS RELATIVE PERCENT: 5 %
EOSINOPHILS RELATIVE PERCENT: 8 %
GFR AFRICAN AMERICAN: >60
GFR NON-AFRICAN AMERICAN: >60
GLOBULIN: 2.8 G/DL
GLOBULIN: 3 G/DL
GLOBULIN: 3.5 G/DL
GLOBULIN: 4.2 G/DL
GLOBULIN: 4.6 G/DL
GLUCOSE BLD-MCNC: 111 MG/DL (ref 70–99)
GLUCOSE BLD-MCNC: 78 MG/DL (ref 70–99)
GLUCOSE BLD-MCNC: 89 MG/DL (ref 70–99)
GLUCOSE BLD-MCNC: 92 MG/DL (ref 70–99)
GLUCOSE BLD-MCNC: 92 MG/DL (ref 70–99)
GLUCOSE URINE, POC: NEGATIVE
HCT VFR BLD CALC: 42.6 % (ref 40.5–52.5)
HCT VFR BLD CALC: 43.4 % (ref 40.5–52.5)
HCT VFR BLD CALC: 49.8 % (ref 40.5–52.5)
HCT VFR BLD CALC: 51.9 % (ref 40.5–52.5)
HEMATOLOGY PATH CONSULT: NORMAL
HEMATOLOGY PATH CONSULT: YES
HEMOGLOBIN: 14 G/DL (ref 13.5–17.5)
HEMOGLOBIN: 14.4 G/DL (ref 13.5–17.5)
HEMOGLOBIN: 16.6 G/DL (ref 13.5–17.5)
HEMOGLOBIN: 16.9 G/DL (ref 13.5–17.5)
HYPOCHROMIA: ABNORMAL
INR BLD: 1.64 (ref 0.86–1.14)
INR BLD: 3.18 (ref 0.86–1.14)
INR BLD: 3.48 (ref 0.86–1.14)
KETONES, POC: ABNORMAL
LACTIC ACID: 4.9 MMOL/L (ref 0.4–2)
LACTIC ACID: 5.1 MMOL/L (ref 0.4–2)
LACTIC ACID: 5.4 MMOL/L (ref 0.4–2)
LACTIC ACID: 5.4 MMOL/L (ref 0.4–2)
LACTIC ACID: 8.2 MMOL/L (ref 0.4–2)
LEUKOCYTE EST, POC: NEGATIVE
LIPASE: 153 U/L (ref 13–60)
LIPASE: 30 U/L (ref 13–60)
LYMPHOCYTES ABSOLUTE: 1 K/UL (ref 1–5.1)
LYMPHOCYTES ABSOLUTE: 1.1 K/UL (ref 1–5.1)
LYMPHOCYTES ABSOLUTE: 1.1 K/UL (ref 1–5.1)
LYMPHOCYTES ABSOLUTE: 1.2 K/UL (ref 1–5.1)
LYMPHOCYTES RELATIVE PERCENT: 3 %
LYMPHOCYTES RELATIVE PERCENT: 9 %
MCH RBC QN AUTO: 28.4 PG (ref 26–34)
MCH RBC QN AUTO: 28.5 PG (ref 26–34)
MCH RBC QN AUTO: 28.5 PG (ref 26–34)
MCH RBC QN AUTO: 28.6 PG (ref 26–34)
MCHC RBC AUTO-ENTMCNC: 32.5 G/DL (ref 31–36)
MCHC RBC AUTO-ENTMCNC: 32.9 G/DL (ref 31–36)
MCHC RBC AUTO-ENTMCNC: 33.3 G/DL (ref 31–36)
MCHC RBC AUTO-ENTMCNC: 33.4 G/DL (ref 31–36)
MCV RBC AUTO: 85.7 FL (ref 80–100)
MCV RBC AUTO: 85.8 FL (ref 80–100)
MCV RBC AUTO: 86.4 FL (ref 80–100)
MCV RBC AUTO: 87.4 FL (ref 80–100)
MONOCYTES ABSOLUTE: 1.3 K/UL (ref 0–1.3)
MONOCYTES ABSOLUTE: 2 K/UL (ref 0–1.3)
MONOCYTES ABSOLUTE: 2.8 K/UL (ref 0–1.3)
MONOCYTES ABSOLUTE: 2.9 K/UL (ref 0–1.3)
MONOCYTES RELATIVE PERCENT: 6 %
MONOCYTES RELATIVE PERCENT: 8 %
MONOCYTES RELATIVE PERCENT: 8 %
MONOCYTES RELATIVE PERCENT: 9.1 %
MYELOCYTE PERCENT: 1 %
MYELOCYTE PERCENT: 1 %
NEUTROPHILS ABSOLUTE: 25.3 K/UL (ref 1.7–7.7)
NEUTROPHILS ABSOLUTE: 28.1 K/UL (ref 1.7–7.7)
NEUTROPHILS ABSOLUTE: 30.3 K/UL (ref 1.7–7.7)
NEUTROPHILS ABSOLUTE: 9.9 K/UL (ref 1.7–7.7)
NEUTROPHILS RELATIVE PERCENT: 51 %
NEUTROPHILS RELATIVE PERCENT: 72.7 %
NEUTROPHILS RELATIVE PERCENT: 73 %
NEUTROPHILS RELATIVE PERCENT: 81 %
NITRITE, POC: NEGATIVE
PDW BLD-RTO: 13.7 % (ref 12.4–15.4)
PDW BLD-RTO: 15.5 % (ref 12.4–15.4)
PDW BLD-RTO: 16.2 % (ref 12.4–15.4)
PDW BLD-RTO: 16.2 % (ref 12.4–15.4)
PH, POC: 6.5
PLATELET # BLD: 165 K/UL (ref 135–450)
PLATELET # BLD: 190 K/UL (ref 135–450)
PLATELET # BLD: 208 K/UL (ref 135–450)
PLATELET # BLD: 266 K/UL (ref 135–450)
PLATELET # BLD: 308 K/UL (ref 135–450)
PLATELET SLIDE REVIEW: ADEQUATE
PMV BLD AUTO: 7.8 FL (ref 5–10.5)
PMV BLD AUTO: 8.1 FL (ref 5–10.5)
PMV BLD AUTO: 8.4 FL (ref 5–10.5)
PMV BLD AUTO: 9.3 FL (ref 5–10.5)
POIKILOCYTES: ABNORMAL
POLYCHROMASIA: ABNORMAL
POTASSIUM REFLEX MAGNESIUM: 5.2 MMOL/L (ref 3.5–5.1)
POTASSIUM SERPL-SCNC: 4.3 MMOL/L (ref 3.5–5.1)
POTASSIUM SERPL-SCNC: 4.9 MMOL/L (ref 3.5–5.1)
POTASSIUM SERPL-SCNC: 5.1 MMOL/L (ref 3.5–5.1)
POTASSIUM SERPL-SCNC: 5.3 MMOL/L (ref 3.5–5.1)
PROSTATE SPECIFIC ANTIGEN: 0.53 NG/ML (ref 0–4)
PROTEIN, POC: ABNORMAL
PROTHROMBIN TIME: 18.7 SEC (ref 9.8–13)
PROTHROMBIN TIME: 36.3 SEC (ref 9.8–13)
PROTHROMBIN TIME: 39.7 SEC (ref 9.8–13)
RBC # BLD: 4.92 M/UL (ref 4.2–5.9)
RBC # BLD: 5.07 M/UL (ref 4.2–5.9)
RBC # BLD: 5.81 M/UL (ref 4.2–5.9)
RBC # BLD: 5.95 M/UL (ref 4.2–5.9)
SLIDE REVIEW: ABNORMAL
SODIUM BLD-SCNC: 131 MMOL/L (ref 136–145)
SODIUM BLD-SCNC: 132 MMOL/L (ref 136–145)
SODIUM BLD-SCNC: 133 MMOL/L (ref 136–145)
SODIUM BLD-SCNC: 134 MMOL/L (ref 136–145)
SODIUM BLD-SCNC: 138 MMOL/L (ref 136–145)
SPECIFIC GRAVITY, POC: 1.02
TOTAL PROTEIN: 5.4 G/DL (ref 6.4–8.2)
TOTAL PROTEIN: 6.2 G/DL (ref 6.4–8.2)
TOTAL PROTEIN: 6.4 G/DL (ref 6.4–8.2)
TOTAL PROTEIN: 6.7 G/DL (ref 6.4–8.2)
TOTAL PROTEIN: 7.3 G/DL (ref 6.4–8.2)
URIC ACID, SERUM: 9.5 MG/DL (ref 3.5–7.2)
UROBILINOGEN, POC: 8
WBC # BLD: 13.7 K/UL (ref 4–11)
WBC # BLD: 32.7 K/UL (ref 4–11)
WBC # BLD: 35.1 K/UL (ref 4–11)
WBC # BLD: 36.1 K/UL (ref 4–11)

## 2018-01-01 PROCEDURE — 3017F COLORECTAL CA SCREEN DOC REV: CPT | Performed by: FAMILY MEDICINE

## 2018-01-01 PROCEDURE — 97530 THERAPEUTIC ACTIVITIES: CPT

## 2018-01-01 PROCEDURE — 1101F PT FALLS ASSESS-DOCD LE1/YR: CPT | Performed by: FAMILY MEDICINE

## 2018-01-01 PROCEDURE — 6370000000 HC RX 637 (ALT 250 FOR IP): Performed by: PHYSICIAN ASSISTANT

## 2018-01-01 PROCEDURE — 74177 CT ABD & PELVIS W/CONTRAST: CPT

## 2018-01-01 PROCEDURE — 72100 X-RAY EXAM L-S SPINE 2/3 VWS: CPT

## 2018-01-01 PROCEDURE — 85049 AUTOMATED PLATELET COUNT: CPT

## 2018-01-01 PROCEDURE — 2580000003 HC RX 258: Performed by: EMERGENCY MEDICINE

## 2018-01-01 PROCEDURE — 6370000000 HC RX 637 (ALT 250 FOR IP): Performed by: INTERNAL MEDICINE

## 2018-01-01 PROCEDURE — 1123F ACP DISCUSS/DSCN MKR DOCD: CPT | Performed by: FAMILY MEDICINE

## 2018-01-01 PROCEDURE — 4040F PNEUMOC VAC/ADMIN/RCVD: CPT | Performed by: FAMILY MEDICINE

## 2018-01-01 PROCEDURE — 86901 BLOOD TYPING SEROLOGIC RH(D): CPT

## 2018-01-01 PROCEDURE — G8996 SWALLOW CURRENT STATUS: HCPCS

## 2018-01-01 PROCEDURE — 2580000003 HC RX 258: Performed by: PHYSICIAN ASSISTANT

## 2018-01-01 PROCEDURE — 6360000004 HC RX CONTRAST MEDICATION: Performed by: FAMILY MEDICINE

## 2018-01-01 PROCEDURE — P9017 PLASMA 1 DONOR FRZ W/IN 8 HR: HCPCS

## 2018-01-01 PROCEDURE — 99239 HOSP IP/OBS DSCHRG MGMT >30: CPT | Performed by: INTERNAL MEDICINE

## 2018-01-01 PROCEDURE — G8417 CALC BMI ABV UP PARAM F/U: HCPCS | Performed by: FAMILY MEDICINE

## 2018-01-01 PROCEDURE — G8484 FLU IMMUNIZE NO ADMIN: HCPCS | Performed by: FAMILY MEDICINE

## 2018-01-01 PROCEDURE — 85025 COMPLETE CBC W/AUTO DIFF WBC: CPT

## 2018-01-01 PROCEDURE — 83605 ASSAY OF LACTIC ACID: CPT

## 2018-01-01 PROCEDURE — 36415 COLL VENOUS BLD VENIPUNCTURE: CPT

## 2018-01-01 PROCEDURE — 78815 PET IMAGE W/CT SKULL-THIGH: CPT

## 2018-01-01 PROCEDURE — 99213 OFFICE O/P EST LOW 20 MIN: CPT | Performed by: FAMILY MEDICINE

## 2018-01-01 PROCEDURE — 1036F TOBACCO NON-USER: CPT | Performed by: FAMILY MEDICINE

## 2018-01-01 PROCEDURE — 86900 BLOOD TYPING SEROLOGIC ABO: CPT

## 2018-01-01 PROCEDURE — G8997 SWALLOW GOAL STATUS: HCPCS

## 2018-01-01 PROCEDURE — 1200000000 HC SEMI PRIVATE

## 2018-01-01 PROCEDURE — 2580000003 HC RX 258: Performed by: INTERNAL MEDICINE

## 2018-01-01 PROCEDURE — 96360 HYDRATION IV INFUSION INIT: CPT

## 2018-01-01 PROCEDURE — 92610 EVALUATE SWALLOWING FUNCTION: CPT

## 2018-01-01 PROCEDURE — 71046 X-RAY EXAM CHEST 2 VIEWS: CPT

## 2018-01-01 PROCEDURE — 80053 COMPREHEN METABOLIC PANEL: CPT

## 2018-01-01 PROCEDURE — 6360000004 HC RX CONTRAST MEDICATION: Performed by: EMERGENCY MEDICINE

## 2018-01-01 PROCEDURE — A9552 F18 FDG: HCPCS | Performed by: INTERNAL MEDICINE

## 2018-01-01 PROCEDURE — G8979 MOBILITY GOAL STATUS: HCPCS

## 2018-01-01 PROCEDURE — 85610 PROTHROMBIN TIME: CPT

## 2018-01-01 PROCEDURE — 82140 ASSAY OF AMMONIA: CPT

## 2018-01-01 PROCEDURE — 36430 TRANSFUSION BLD/BLD COMPNT: CPT

## 2018-01-01 PROCEDURE — 36415 COLL VENOUS BLD VENIPUNCTURE: CPT | Performed by: FAMILY MEDICINE

## 2018-01-01 PROCEDURE — 99232 SBSQ HOSP IP/OBS MODERATE 35: CPT | Performed by: INTERNAL MEDICINE

## 2018-01-01 PROCEDURE — 47000 NEEDLE BIOPSY OF LIVER PERQ: CPT

## 2018-01-01 PROCEDURE — 97166 OT EVAL MOD COMPLEX 45 MIN: CPT

## 2018-01-01 PROCEDURE — 6370000000 HC RX 637 (ALT 250 FOR IP): Performed by: EMERGENCY MEDICINE

## 2018-01-01 PROCEDURE — 6370000000 HC RX 637 (ALT 250 FOR IP): Performed by: RADIOLOGY

## 2018-01-01 PROCEDURE — 6360000002 HC RX W HCPCS: Performed by: INTERNAL MEDICINE

## 2018-01-01 PROCEDURE — 84550 ASSAY OF BLOOD/URIC ACID: CPT

## 2018-01-01 PROCEDURE — G8427 DOCREV CUR MEDS BY ELIG CLIN: HCPCS | Performed by: FAMILY MEDICINE

## 2018-01-01 PROCEDURE — 92526 ORAL FUNCTION THERAPY: CPT

## 2018-01-01 PROCEDURE — G8988 SELF CARE GOAL STATUS: HCPCS

## 2018-01-01 PROCEDURE — 99223 1ST HOSP IP/OBS HIGH 75: CPT | Performed by: INTERNAL MEDICINE

## 2018-01-01 PROCEDURE — G8427 DOCREV CUR MEDS BY ELIG CLIN: HCPCS | Performed by: SURGERY

## 2018-01-01 PROCEDURE — 88307 TISSUE EXAM BY PATHOLOGIST: CPT

## 2018-01-01 PROCEDURE — 99284 EMERGENCY DEPT VISIT MOD MDM: CPT

## 2018-01-01 PROCEDURE — 99214 OFFICE O/P EST MOD 30 MIN: CPT | Performed by: SURGERY

## 2018-01-01 PROCEDURE — G8987 SELF CARE CURRENT STATUS: HCPCS

## 2018-01-01 PROCEDURE — 71260 CT THORAX DX C+: CPT

## 2018-01-01 PROCEDURE — 74170 CT ABD WO CNTRST FLWD CNTRST: CPT

## 2018-01-01 PROCEDURE — 3017F COLORECTAL CA SCREEN DOC REV: CPT | Performed by: SURGERY

## 2018-01-01 PROCEDURE — 1101F PT FALLS ASSESS-DOCD LE1/YR: CPT | Performed by: SURGERY

## 2018-01-01 PROCEDURE — 1036F TOBACCO NON-USER: CPT | Performed by: SURGERY

## 2018-01-01 PROCEDURE — 97116 GAIT TRAINING THERAPY: CPT

## 2018-01-01 PROCEDURE — 3430000000 HC RX DIAGNOSTIC RADIOPHARMACEUTICAL: Performed by: INTERNAL MEDICINE

## 2018-01-01 PROCEDURE — 0FB23ZX EXCISION OF LEFT LOBE LIVER, PERCUTANEOUS APPROACH, DIAGNOSTIC: ICD-10-PCS | Performed by: RADIOLOGY

## 2018-01-01 PROCEDURE — 97162 PT EVAL MOD COMPLEX 30 MIN: CPT

## 2018-01-01 PROCEDURE — G8978 MOBILITY CURRENT STATUS: HCPCS

## 2018-01-01 PROCEDURE — 6360000004 HC RX CONTRAST MEDICATION

## 2018-01-01 PROCEDURE — G8417 CALC BMI ABV UP PARAM F/U: HCPCS | Performed by: SURGERY

## 2018-01-01 PROCEDURE — 1123F ACP DISCUSS/DSCN MKR DOCD: CPT | Performed by: SURGERY

## 2018-01-01 PROCEDURE — 2580000003 HC RX 258

## 2018-01-01 PROCEDURE — 81003 URINALYSIS AUTO W/O SCOPE: CPT | Performed by: FAMILY MEDICINE

## 2018-01-01 PROCEDURE — 4040F PNEUMOC VAC/ADMIN/RCVD: CPT | Performed by: SURGERY

## 2018-01-01 PROCEDURE — 70360 X-RAY EXAM OF NECK: CPT

## 2018-01-01 PROCEDURE — 88342 IMHCHEM/IMCYTCHM 1ST ANTB: CPT

## 2018-01-01 PROCEDURE — 86850 RBC ANTIBODY SCREEN: CPT

## 2018-01-01 PROCEDURE — 6360000002 HC RX W HCPCS: Performed by: PHYSICIAN ASSISTANT

## 2018-01-01 PROCEDURE — 83690 ASSAY OF LIPASE: CPT

## 2018-01-01 PROCEDURE — 88341 IMHCHEM/IMCYTCHM EA ADD ANTB: CPT

## 2018-01-01 RX ORDER — ONDANSETRON 2 MG/ML
4 INJECTION INTRAMUSCULAR; INTRAVENOUS EVERY 6 HOURS PRN
Status: DISCONTINUED | OUTPATIENT
Start: 2018-01-01 | End: 2018-01-01 | Stop reason: CLARIF

## 2018-01-01 RX ORDER — SODIUM CHLORIDE 0.9 % (FLUSH) 0.9 %
10 SYRINGE (ML) INJECTION PRN
Status: DISCONTINUED | OUTPATIENT
Start: 2018-01-01 | End: 2018-01-01 | Stop reason: HOSPADM

## 2018-01-01 RX ORDER — OMEPRAZOLE 40 MG/1
40 CAPSULE, DELAYED RELEASE ORAL DAILY
Qty: 14 CAPSULE | Refills: 0 | Status: SHIPPED | OUTPATIENT
Start: 2018-01-01 | End: 2018-01-01

## 2018-01-01 RX ORDER — 0.9 % SODIUM CHLORIDE 0.9 %
250 INTRAVENOUS SOLUTION INTRAVENOUS ONCE
Status: COMPLETED | OUTPATIENT
Start: 2018-01-01 | End: 2018-01-01

## 2018-01-01 RX ORDER — ACYCLOVIR 400 MG/1
TABLET ORAL
Qty: 90 TABLET | Refills: 0 | Status: ON HOLD | OUTPATIENT
Start: 2018-01-01 | End: 2018-01-01 | Stop reason: HOSPADM

## 2018-01-01 RX ORDER — SODIUM CHLORIDE 9 MG/ML
INJECTION, SOLUTION INTRAVENOUS
Status: COMPLETED
Start: 2018-01-01 | End: 2018-01-01

## 2018-01-01 RX ORDER — 0.9 % SODIUM CHLORIDE 0.9 %
1000 INTRAVENOUS SOLUTION INTRAVENOUS ONCE
Status: COMPLETED | OUTPATIENT
Start: 2018-01-01 | End: 2018-01-01

## 2018-01-01 RX ORDER — HYDROCODONE BITARTRATE AND ACETAMINOPHEN 5; 325 MG/1; MG/1
1 TABLET ORAL EVERY 4 HOURS PRN
Status: DISCONTINUED | OUTPATIENT
Start: 2018-01-01 | End: 2018-01-01 | Stop reason: HOSPADM

## 2018-01-01 RX ORDER — SODIUM CHLORIDE 0.9 % (FLUSH) 0.9 %
10 SYRINGE (ML) INJECTION EVERY 12 HOURS SCHEDULED
Status: DISCONTINUED | OUTPATIENT
Start: 2018-01-01 | End: 2018-01-01 | Stop reason: HOSPADM

## 2018-01-01 RX ORDER — OMEPRAZOLE 20 MG/1
20 CAPSULE, DELAYED RELEASE ORAL EVERY MORNING
Status: DISCONTINUED | OUTPATIENT
Start: 2018-01-01 | End: 2018-01-01 | Stop reason: CLARIF

## 2018-01-01 RX ORDER — ONDANSETRON 4 MG/1
4 TABLET, ORALLY DISINTEGRATING ORAL EVERY 6 HOURS PRN
Qty: 20 TABLET | Refills: 2 | Status: SHIPPED | OUTPATIENT
Start: 2018-01-01 | End: 2018-01-01

## 2018-01-01 RX ORDER — AMOXICILLIN 500 MG/1
1000 CAPSULE ORAL 2 TIMES DAILY
Qty: 40 CAPSULE | Refills: 0 | Status: SHIPPED | OUTPATIENT
Start: 2018-01-01 | End: 2018-01-01

## 2018-01-01 RX ORDER — FAMOTIDINE 20 MG/1
20 TABLET, FILM COATED ORAL 2 TIMES DAILY
Qty: 60 TABLET | Refills: 3 | Status: SHIPPED | OUTPATIENT
Start: 2018-01-01

## 2018-01-01 RX ORDER — NICOTINE POLACRILEX 4 MG/1
40 GUM, CHEWING ORAL DAILY
Qty: 60 TABLET | Refills: 1 | Status: SHIPPED | OUTPATIENT
Start: 2018-01-01 | End: 2018-01-01

## 2018-01-01 RX ORDER — CELECOXIB 200 MG/1
200 CAPSULE ORAL DAILY
Qty: 60 CAPSULE | Refills: 3 | Status: CANCELLED | OUTPATIENT
Start: 2018-01-01

## 2018-01-01 RX ORDER — SODIUM CHLORIDE 9 MG/ML
INJECTION, SOLUTION INTRAVENOUS
Status: DISCONTINUED
Start: 2018-01-01 | End: 2018-01-01

## 2018-01-01 RX ORDER — HYDROCODONE BITARTRATE AND ACETAMINOPHEN 5; 325 MG/1; MG/1
1 TABLET ORAL EVERY 6 HOURS PRN
Status: DISCONTINUED | OUTPATIENT
Start: 2018-01-01 | End: 2018-01-01

## 2018-01-01 RX ORDER — OMEPRAZOLE 20 MG/1
20 CAPSULE, DELAYED RELEASE ORAL DAILY
Qty: 30 CAPSULE | Refills: 0 | Status: CANCELLED | OUTPATIENT
Start: 2018-01-01

## 2018-01-01 RX ORDER — OMEPRAZOLE 20 MG/1
20 CAPSULE, DELAYED RELEASE ORAL DAILY
Qty: 30 CAPSULE | Refills: 0 | Status: SHIPPED | OUTPATIENT
Start: 2018-01-01

## 2018-01-01 RX ORDER — HYDROCODONE BITARTRATE AND ACETAMINOPHEN 5; 325 MG/1; MG/1
1 TABLET ORAL EVERY 4 HOURS PRN
Qty: 20 TABLET | Refills: 0 | Status: SHIPPED | OUTPATIENT
Start: 2018-01-01 | End: 2018-08-10

## 2018-01-01 RX ORDER — SODIUM CHLORIDE 9 MG/ML
INJECTION, SOLUTION INTRAVENOUS CONTINUOUS
Status: DISCONTINUED | OUTPATIENT
Start: 2018-01-01 | End: 2018-01-01

## 2018-01-01 RX ORDER — SALSALATE 750 MG
750-1500 TABLET ORAL 2 TIMES DAILY WITH MEALS
Qty: 30 TABLET | Refills: 0 | Status: SHIPPED | OUTPATIENT
Start: 2018-01-01 | End: 2018-01-01

## 2018-01-01 RX ORDER — PANTOPRAZOLE SODIUM 40 MG/1
40 TABLET, DELAYED RELEASE ORAL
Status: DISCONTINUED | OUTPATIENT
Start: 2018-01-01 | End: 2018-01-01 | Stop reason: HOSPADM

## 2018-01-01 RX ORDER — OXYCODONE HYDROCHLORIDE AND ACETAMINOPHEN 5; 325 MG/1; MG/1
1 TABLET ORAL ONCE
Status: COMPLETED | OUTPATIENT
Start: 2018-01-01 | End: 2018-01-01

## 2018-01-01 RX ORDER — FLUDEOXYGLUCOSE F 18 200 MCI/ML
15.2 INJECTION, SOLUTION INTRAVENOUS
Status: COMPLETED | OUTPATIENT
Start: 2018-01-01 | End: 2018-01-01

## 2018-01-01 RX ORDER — FUROSEMIDE 10 MG/ML
20 INJECTION INTRAMUSCULAR; INTRAVENOUS ONCE
Status: COMPLETED | OUTPATIENT
Start: 2018-01-01 | End: 2018-01-01

## 2018-01-01 RX ORDER — TRAMADOL HYDROCHLORIDE 50 MG/1
50-100 TABLET ORAL 4 TIMES DAILY PRN
Qty: 40 TABLET | Refills: 0 | Status: SHIPPED | OUTPATIENT
Start: 2018-01-01 | End: 2018-01-01

## 2018-01-01 RX ORDER — AMOXICILLIN 500 MG/1
1000 CAPSULE ORAL 2 TIMES DAILY
Qty: 40 CAPSULE | Refills: 0 | Status: SHIPPED | OUTPATIENT
Start: 2018-01-01 | End: 2018-01-01 | Stop reason: SDUPTHER

## 2018-01-01 RX ADMIN — PANTOPRAZOLE SODIUM 40 MG: 40 TABLET, DELAYED RELEASE ORAL at 06:24

## 2018-01-01 RX ADMIN — Medication 10 ML: at 22:19

## 2018-01-01 RX ADMIN — HYDROCODONE BITARTRATE AND ACETAMINOPHEN 1 TABLET: 5; 325 TABLET ORAL at 09:54

## 2018-01-01 RX ADMIN — IOPAMIDOL 75 ML: 755 INJECTION, SOLUTION INTRAVENOUS at 15:51

## 2018-01-01 RX ADMIN — FLUDEOXYGLUCOSE F 18 15.2 MILLICURIE: 200 INJECTION, SOLUTION INTRAVENOUS at 08:01

## 2018-01-01 RX ADMIN — Medication 10 ML: at 10:15

## 2018-01-01 RX ADMIN — SODIUM CHLORIDE 250 ML: 9 INJECTION, SOLUTION INTRAVENOUS at 22:18

## 2018-01-01 RX ADMIN — HYDROCODONE BITARTRATE AND ACETAMINOPHEN 1 TABLET: 5; 325 TABLET ORAL at 16:25

## 2018-01-01 RX ADMIN — Medication 10 ML: at 22:26

## 2018-01-01 RX ADMIN — SODIUM CHLORIDE 100 ML: 9 INJECTION, SOLUTION INTRAVENOUS at 11:54

## 2018-01-01 RX ADMIN — PANTOPRAZOLE SODIUM 40 MG: 40 TABLET, DELAYED RELEASE ORAL at 06:09

## 2018-01-01 RX ADMIN — HYDROCODONE BITARTRATE AND ACETAMINOPHEN 1 TABLET: 5; 325 TABLET ORAL at 23:49

## 2018-01-01 RX ADMIN — HYDROCODONE BITARTRATE AND ACETAMINOPHEN 1 TABLET: 5; 325 TABLET ORAL at 15:50

## 2018-01-01 RX ADMIN — FUROSEMIDE 20 MG: 10 INJECTION, SOLUTION INTRAMUSCULAR; INTRAVENOUS at 09:08

## 2018-01-01 RX ADMIN — HYDROCODONE BITARTRATE AND ACETAMINOPHEN 1 TABLET: 5; 325 TABLET ORAL at 22:32

## 2018-01-01 RX ADMIN — HYDROCODONE BITARTRATE AND ACETAMINOPHEN 1 TABLET: 5; 325 TABLET ORAL at 04:02

## 2018-01-01 RX ADMIN — Medication 100 ML: at 11:54

## 2018-01-01 RX ADMIN — HYDROCODONE BITARTRATE AND ACETAMINOPHEN 1 TABLET: 5; 325 TABLET ORAL at 06:09

## 2018-01-01 RX ADMIN — HYDROCODONE BITARTRATE AND ACETAMINOPHEN 1 TABLET: 5; 325 TABLET ORAL at 23:13

## 2018-01-01 RX ADMIN — SODIUM CHLORIDE: 9 INJECTION, SOLUTION INTRAVENOUS at 13:17

## 2018-01-01 RX ADMIN — OXYCODONE HYDROCHLORIDE AND ACETAMINOPHEN 1 TABLET: 5; 325 TABLET ORAL at 09:08

## 2018-01-01 RX ADMIN — IOPAMIDOL 75 ML: 755 INJECTION, SOLUTION INTRAVENOUS at 09:16

## 2018-01-01 RX ADMIN — HYDROCODONE BITARTRATE AND ACETAMINOPHEN 1 TABLET: 5; 325 TABLET ORAL at 16:15

## 2018-01-01 RX ADMIN — IOPAMIDOL 75 ML: 755 INJECTION, SOLUTION INTRAVENOUS at 12:45

## 2018-01-01 RX ADMIN — HYDROCODONE BITARTRATE AND ACETAMINOPHEN 1 TABLET: 5; 325 TABLET ORAL at 10:23

## 2018-01-01 RX ADMIN — Medication 10 ML: at 20:06

## 2018-01-01 RX ADMIN — SODIUM CHLORIDE 1000 ML: 9 INJECTION, SOLUTION INTRAVENOUS at 11:02

## 2018-01-01 RX ADMIN — Medication 10 ML: at 09:54

## 2018-01-01 RX ADMIN — SODIUM CHLORIDE: 9 INJECTION, SOLUTION INTRAVENOUS at 07:55

## 2018-01-01 RX ADMIN — SODIUM CHLORIDE: 9 INJECTION, SOLUTION INTRAVENOUS at 20:07

## 2018-01-01 RX ADMIN — HYDROCODONE BITARTRATE AND ACETAMINOPHEN 1 TABLET: 5; 325 TABLET ORAL at 07:54

## 2018-01-01 RX ADMIN — HYDROCODONE BITARTRATE AND ACETAMINOPHEN 1 TABLET: 5; 325 TABLET ORAL at 15:27

## 2018-01-01 RX ADMIN — LIDOCAINE HYDROCHLORIDE: 20 SOLUTION ORAL; TOPICAL at 11:02

## 2018-01-01 RX ADMIN — IOHEXOL 50 ML: 240 INJECTION, SOLUTION INTRATHECAL; INTRAVASCULAR; INTRAVENOUS; ORAL at 09:16

## 2018-01-01 RX ADMIN — PHYTONADIONE 10 MG: 10 INJECTION, EMULSION INTRAMUSCULAR; INTRAVENOUS; SUBCUTANEOUS at 11:50

## 2018-01-01 ASSESSMENT — PAIN DESCRIPTION - LOCATION
LOCATION: ABDOMEN
LOCATION: ABDOMEN
LOCATION: BACK
LOCATION: ABDOMEN
LOCATION: BACK;FLANK
LOCATION: ABDOMEN
LOCATION: ABDOMEN

## 2018-01-01 ASSESSMENT — PAIN DESCRIPTION - FREQUENCY
FREQUENCY: CONTINUOUS
FREQUENCY: CONTINUOUS
FREQUENCY: INTERMITTENT
FREQUENCY: CONTINUOUS
FREQUENCY: CONTINUOUS

## 2018-01-01 ASSESSMENT — PAIN SCALES - GENERAL
PAINLEVEL_OUTOF10: 2
PAINLEVEL_OUTOF10: 4
PAINLEVEL_OUTOF10: 3
PAINLEVEL_OUTOF10: 4
PAINLEVEL_OUTOF10: 3
PAINLEVEL_OUTOF10: 8
PAINLEVEL_OUTOF10: 0
PAINLEVEL_OUTOF10: 4
PAINLEVEL_OUTOF10: 5
PAINLEVEL_OUTOF10: 4
PAINLEVEL_OUTOF10: 0
PAINLEVEL_OUTOF10: 4
PAINLEVEL_OUTOF10: 3
PAINLEVEL_OUTOF10: 4
PAINLEVEL_OUTOF10: 3
PAINLEVEL_OUTOF10: 2
PAINLEVEL_OUTOF10: 3
PAINLEVEL_OUTOF10: 0
PAINLEVEL_OUTOF10: 6

## 2018-01-01 ASSESSMENT — PAIN DESCRIPTION - ONSET
ONSET: ON-GOING
ONSET: GRADUAL

## 2018-01-01 ASSESSMENT — PAIN DESCRIPTION - ORIENTATION
ORIENTATION: MID
ORIENTATION: LOWER
ORIENTATION: LEFT;LOWER
ORIENTATION: RIGHT;LEFT;LOWER
ORIENTATION: RIGHT;LEFT;LOWER
ORIENTATION: MID

## 2018-01-01 ASSESSMENT — PATIENT HEALTH QUESTIONNAIRE - PHQ9
2. FEELING DOWN, DEPRESSED OR HOPELESS: 0
1. LITTLE INTEREST OR PLEASURE IN DOING THINGS: 0
SUM OF ALL RESPONSES TO PHQ9 QUESTIONS 1 & 2: 0
SUM OF ALL RESPONSES TO PHQ QUESTIONS 1-9: 0

## 2018-01-01 ASSESSMENT — PAIN DESCRIPTION - DESCRIPTORS
DESCRIPTORS: CONSTANT
DESCRIPTORS: OTHER (COMMENT)
DESCRIPTORS: PATIENT UNABLE TO DESCRIBE
DESCRIPTORS: ACHING;DULL

## 2018-01-01 ASSESSMENT — PAIN DESCRIPTION - PROGRESSION
CLINICAL_PROGRESSION: GRADUALLY IMPROVING
CLINICAL_PROGRESSION: NOT CHANGED
CLINICAL_PROGRESSION: NOT CHANGED

## 2018-01-01 ASSESSMENT — PAIN DESCRIPTION - PAIN TYPE
TYPE: ACUTE PAIN
TYPE: CHRONIC PAIN

## 2018-03-20 NOTE — TELEPHONE ENCOUNTER
Patient was seen on 2/27/18 for bacterial sinusitis, was prescribed amoxil, has the same symptoms head congestion, yellow drainage, dry cough, feels like it is moving to his chest this time right side of chest is sore, unable to come in the morning, no appts in the afternoon, would you be willing to call medication in, please advise.

## 2018-07-02 NOTE — PATIENT INSTRUCTIONS
Please compare this printed medication list with your medications at home to be sure they are the same. If you have any medications that are different please contact us immediately at [phone]. Also review your allergies that we have listed, these may also include medications that you have not been able to tolerate, make sure everything listed is correct. If you have any allergies that are different please contact us immediately at [phone].

## 2018-07-09 PROBLEM — R10.11 RUQ ABDOMINAL PAIN: Status: ACTIVE | Noted: 2018-01-01

## 2018-07-11 NOTE — TELEPHONE ENCOUNTER
Patient said insurance said they will not cover the salsalate but they will cover Diflunisal if you can switch

## 2018-07-12 NOTE — PROGRESS NOTES
Assessment and plan  1. Hematuria, unspecified type  Urinalysis was consistent with concentrated urine. Push fluids  - POCT Urinalysis No Micro (Auto)    2. Dyspnea on exertion - etiology not clear  - XR CHEST STANDARD (2 VW); Future    3. Acute midline low back pain without sciatica  Tramadol 50 mg 1-2 q.i.d. p.r.n. pain. Hold all anti-inflammatories for now  - XR LUMBAR SPINE (2-3 VIEWS); Future    Return to clinic in 4 days to review findings. I also prescribed Zofran, encouraged food intake using ensure if needed. Subjective  Patient returns with his wife. She wanted him to have an appointment because he's having progressive weight loss fatigue shortness of breath and progressive back pain just since his last visit 9 days ago. He's lost 8 pounds. Reports his appetite is poor. Today was the first day he's had no abdominal pain. He denies any nausea, just some unsettled sensations in the stomach. He's noted dyspnea on exertion developed over the last week but not today. He denies any cough wheeze or chest congestion. His back pain is getting progressively worse. It's located over the lumbar spine with no radiation. He was also concerned he had blood in his urine because it looked dark and had a foul odor. His gallbladder ultrasound showed multiple gallstones. However there were also multiple hepatic lesions that were concerning for possible metastasis. He is scheduled for a CT of his abdomen and pelvis in 2 days. Objective  BP (!) 112/58   Pulse 104   Temp 97.4 °F (36.3 °C) (Oral)   Wt 221 lb (100.2 kg)   SpO2 95%   BMI 30.82 kg/m²   Patient appears more chronically ill today. Skin is warm and dry and turgor seems poor. Lungs clear to auscultation with equal breath sounds. No diminished sounds at this time. Heart regular rhythm with no murmur rub or gallop. There is no peripheral edema.   There is no tenderness or deformity to palpation of the lumbar spine    [address] [place] [address],

## 2018-07-12 NOTE — TELEPHONE ENCOUNTER
Patient notified, wife called back wanted patient to be seen due to weight loss since last appt, scheduled patient for today, will wait to send medication until seen incase of any change to medications.

## 2018-07-17 PROBLEM — C78.7 LIVER METASTASES (HCC): Status: ACTIVE | Noted: 2018-01-01

## 2018-07-18 NOTE — TELEPHONE ENCOUNTER
Patient's son called for patient. He said he was very sob today after going to specialist appt today. He wanted to see if oxygen could be ordered for patient. His oxygen was 87% at his visit here on Monday when it was first checked.   Could you do addendum to office note or do you need to have him come back in

## 2018-07-26 NOTE — ED NOTES
Saline lock removed intact. IV site looked unremarkable. Pressure dressing applied. Discharge instructions reviewed with Mr. Lilibeth Guevara. He verbalized understanding. Copy of discharge instructions and prescriptions given. Mr. Lilibeth Guevara was discharged to home in good condition per personal vehicle, friend/family driving. He exited the ED without difficulty.         Lesli Templeton RN  07/26/18 0564

## 2018-07-26 NOTE — ED PROVIDER NOTES
7/26/2018 10:51 am COMPARISON: None. HISTORY: ORDERING SYSTEM PROVIDED HISTORY: difficulty swallowing TECHNOLOGIST PROVIDED HISTORY: Reason for exam:->difficulty swallowing Ordering Physician Provided Reason for Exam: difficulty swallowing Acuity: Acute Type of Exam: Initial FINDINGS: The soft tissue contours of the neck are normal.  Epiglottis is normal. There is no prevertebral soft tissue swelling. There is spondylosis at multiple levels, including disc space narrowing and anterior spurring at C3-4 at C6-7. No foreign body is seen. Normal contours of the airway. Ct Abdomen Pelvis W Iv Contrast Additional Contrast? None    Result Date: 7/26/2018  EXAMINATION: CT OF THE ABDOMEN AND PELVIS WITH CONTRAST 7/26/2018 12:46 pm TECHNIQUE: CT of the abdomen and pelvis was performed with the administration of intravenous contrast. Multiplanar reformatted images are provided for review. Dose modulation, iterative reconstruction, and/or weight based adjustment of the mA/kV was utilized to reduce the radiation dose to as low as reasonably achievable. COMPARISON: 07/14/2018 HISTORY: ORDERING SYSTEM PROVIDED HISTORY: abdominal pain TECHNOLOGIST PROVIDED HISTORY: Additional Contrast?->None Ordering Physician Provided Reason for Exam: LIVER METS/ HAD PET SCAN TODAY/ SENT TO ER FOR EVAL Acuity: Chronic Type of Exam: Ongoing FINDINGS: Lower Chest: Limited images of the lower chest reveal a bandlike opacity in the right base, compatible with atelectasis or scar. A  4 mm noncalcified nodule in the lateral aspect of the left lower lobe, unchanged from the prior examination. Calcifications are noted in the aorta and the coronary arteries. There is a calcified lymph node adjacent to the distal esophagus. Organs: There are innumerable hypodense lesions throughout the liver, compatible with metastatic disease. Size and number of lesions appears grossly similar to prior examination.   For instance, a dominant lesion in the left hepatic lobe measures 8.1 x 7.5 cm on today's study, compared to 8.0 x 7.6 cm previously. The gallbladder is contracted. There is cholelithiasis. The spleen, pancreas, and adrenals are unremarkable. Kidneys demonstrate symmetric enhancement. There is mild dilation of the left renal collecting system similar to the previous exam.  There is left nephrolithiasis. GI/Bowel: Evaluation of the bowel is mildly limited by underdistention. There is no evidence of obstruction. There is colonic diverticulosis. There is minimal mesenteric stranding in the right abdomen adjacent to the right colon without definite colon wall thickening. Trace amount of fluid is seen in the right pericolic gutter. Pelvis: Bladder is nondistended, limiting evaluation. Prostate is grossly unremarkable. Peritoneum/Retroperitoneum: Vascular calcifications are noted within the aorta without aneurysmal dilatation. Portal vein, splenic vein, and SFA are patent. There is extensive adenopathy in the retroperitoneum and in the cory hepatis, similar to prior examination. A representative portacaval lymph node measures 3.8 x 2.3 cm on the current study compared to 3.8 x 2.5 cm previously. Trace amount of fluid is noted in the right paracolic gutter and in the pelvis. No evidence of pneumoperitoneum. Bones/Soft Tissues: No acute soft tissue abnormality. No destructive osseous lesions. Degenerative changes are present in the lumbar spine. Extensive hepatic and rupa metastatic disease as detailed above, not substantially changed from the previous examination. There is a 4 mm nodule in the left lung base. Given the abdominal findings, metastatic disease is a consideration. Recommend continued follow-up. Trace ascites. Stranding in the right abdomen is nonspecific and could be related to an inflammatory process (such as colitis) or possibly peritoneal carcinomatosis.  Mild dilation of the renal collecting system, similar to previous that portions of this note may have been completed with a voice recognition program. Efforts were made to edit the dictations but occasionally words are mis-transcribed.)    Debra Wills, DO   [address] [facility], DO  07/26/18 3964

## 2018-07-27 NOTE — PATIENT INSTRUCTIONS
Please compare this printed medication list with your medications at home to be sure they are the same. If you have any medications that are different please contact us immediately at [phone]. Also review your allergies that we have listed, these may also include medications that you have not been able to tolerate, make sure everything listed is correct. If you have any allergies that are different please contact us immediately at [phone].

## 2018-07-27 NOTE — PROGRESS NOTES
Assessment and plan  1. Liver metastases ([address]  Follow-up with biopsy is planned. 2. Generalized abdominal pain  Continue current medications including GI cocktail as needed    3. Dysphagia, unspecified type  Encourage adequate fluid and caloric intake. Will likely need upper endoscopy pending result of biopsy    Return to clinic or call prn if these symptoms worsen or fail to improve and resolve as discussed    Subjective  Patient returns for hospital follow-up. He was in the emergency room yesterday after his PET scan with abdominal pain. He was found to be dehydrated. He had some electrolyte abnormalities. He felt much better after IV fluid. He was given a GI cocktail and his abdominal pain resolved. He reports it has not reoccurred since. They did give him a prescription for GI cocktail to have at home. He is scheduled for a biopsy of his liver in 4 days. He is still quite short of breath with exertion but insurance would not cover supplemental oxygen. He does not desaturate at rest.  His liver enzymes in the ER are much worse and he is now jaundiced. He denies any nausea. He does report yesterday he had some significant dysphagia. It's happened twice. Looking back he thinks he's had some difficulty swallowing now for a month. Objective  /74   Pulse 104   Temp 97.7 °F (36.5 °C) (Oral)   Wt 218 lb (98.9 kg)   SpO2 96%   BMI 29.57 kg/m²   Patient in no acute distress. Sclera and skin are icteric  Neck has no masses  Lungs clear to auscultation with equal breath sounds  Heart rhythm regular but mildly tachycardic    Patria Young MD    The note was completed using Dragon voice recognition transcription. Every effort was made to ensure accuracy; however, inadvertent  transcription errors may be present despite my best efforts to edit errors.

## 2018-07-29 NOTE — PROGRESS NOTES
[facility]    HPI:  Patient is 72y.o. year old male seen at request of Emigdio Donis MD.  He reports having recent upper abdominal pain and some food intolerance. He was give PPI without relief. GBUS showed stones but also showed liver masses. Follow up CT showed suspected liver metastasis. He has had significant weight loss. Past Medical History:   Diagnosis Date    Recurrent cold sores     Unspecified sleep apnea        Past Surgical History:   Procedure Laterality Date    APPENDECTOMY      CATARACT REMOVAL WITH IMPLANT Right 06/29/2017    PHACO EMULSIFICATION OF CATARACT WITH INTRAOCULAR LENS IMPLANT RIGHT EYE    EYE SURGERY Left 04/20/2017    catarct removal with lens implant    HERNIA REPAIR  05/30/2017    ATTEMPTED LAPAROSCOPIC VENTRAL HERNIA REPAIR WITH MESH, CONVERTED TO OPEN VENTRAL HERNIA REPAIR WITH MESH    OTHER SURGICAL HISTORY [facility] 1990    [facility]       Current Outpatient Prescriptions on File Prior to Visit   Medication Sig Dispense Refill    omeprazole (PRILOSEC) 20 MG delayed release capsule Take 1 capsule by mouth daily For four weeks after completing the 40mg tabs 30 capsule 0    acyclovir (ZOVIRAX) 400 MG tablet TAKE 2 TABLETS 5 TIMES DAILY AT ONSET OF COLD SORE 90 tablet 0     No current facility-administered medications on file prior to visit. No Known Allergies    Social History     Social History    Marital status:      Spouse name: N/A    Number of children: N/A    Years of education: N/A     Occupational History    Not on file.      Social History Main Topics    Smoking status: Former Smoker     Packs/day: 1.00     Years: 15.00     Types: Cigarettes     Quit date: 1/1/1990    Smokeless tobacco: Never Used    Alcohol use No    Drug use: No    Sexual activity: Not on file     Other Topics Concern    Not on file     Social History Narrative    No narrative on file       Family History   Problem Relation Age of Onset    Heart Disease Father        ROS:  He reports no complaints related to the eyes, ears , nose throat or mouth. He admits to weight loss. No chest pain. No SOB. No urinary complaints. No musculoskeletal complaints. No skin rashes. No neurologic deficits. No bleeding tendencies. GI complaints include upper abdominal pain. Physical Exam:  Vitals:    07/18/18 1432   BP: 110/64   220#  5'11\"    General:  Comfortable. No distress. Eyes:  No scleral icterus  Ears:  Normal  Nose:  Normal  Mouth:  Mucous membranes moist  Respiratory: Lungs CTA. No accessory muscle use. Heart:  Regular rhythm  Abdomen:  Soft. Non distended. Mild upper abdominal tenderness. Musculoskeletal:  No abnormal movements. ROM extremities normal.  Skin:  No rashes. Neurologic:  No focal deficits. Psychiatric:  AAA. O x 3.    Radiographic studies:  CT and GBUS with multiple liver masses. Laboratory Studies: Reviewed    ASSESSMENT:  Encounter Diagnoses   Name Primary?  Upper abdominal pain Yes    Liver masses            PLAN:  I explained to the patient, his wife and multiple family members that the gallstones were likely incidental finding. He had attempted lap ventral hernia repair in 2017. He had to have open repair because of adhesions. He is malnourished secondary to underlying new diagnosis of malignancy that appears advanced. He is not a candidate for gallbladder surgery given all that is going on. He needs liver biopsy to try and establish a diagnosis and possible origin of the malignancy. He will see Dr. Luanne Cushing for an opinion.

## 2018-07-31 PROBLEM — R79.1 SUPRATHERAPEUTIC INR: Status: ACTIVE | Noted: 2018-01-01

## 2018-07-31 PROBLEM — C79.9 METASTATIC DISEASE (HCC): Status: ACTIVE | Noted: 2018-01-01

## 2018-07-31 NOTE — H&P
Hospital Medicine History & Physical      PCP: Tessy Camacho MD    Date of Admission: 7/31/2018    Date of Service: Pt seen/examined on 7/31/2018     Chief Complaint:  No chief complaint on file. History Of Present Illness: The patient is a 72 y.o. male with a PMH of metastatic liver disease who presented from radiology due to elevated INR. Pt is being followed by Dr. Maranda Bone in oncology due to concern for metastatic disease with multiple nodules on the liver and in the lung. Pt was seen by his PCP Dr. Umesh Diego on 7/2/2018 for epigastric abdominal pain and an U/S was ordered at that time which showed multiple nodules and concern for metastasis. He had an appointment for a CT Needle Biopsy on the day of admission however, at that time his INR was noted to be 3.18. Pt was admitted to med surg and hematology/oncology was consulted. Past Medical History:        Diagnosis Date    Metastatic disease ([address]     Recurrent cold sores     Unspecified sleep apnea        Past Surgical History:        Procedure Laterality Date    APPENDECTOMY      CATARACT REMOVAL WITH IMPLANT Right 06/29/2017    PHACO EMULSIFICATION OF CATARACT WITH INTRAOCULAR LENS IMPLANT RIGHT EYE    EYE SURGERY Left 04/20/2017    catarct removal with lens implant    HERNIA REPAIR  05/30/2017    ATTEMPTED LAPAROSCOPIC VENTRAL HERNIA REPAIR WITH MESH, CONVERTED TO OPEN VENTRAL HERNIA REPAIR WITH MESH    OTHER SURGICAL HISTORY Right 1990    [facility]       Medications Prior to Admission:    Prior to Admission medications    Medication Sig Start Date End Date Taking?  Authorizing Provider   famotidine (PEPCID) 20 MG tablet Take 1 tablet by mouth 2 times daily 7/26/18  Yes Clovis Kwong DO   omeprazole (PRILOSEC) 20 MG delayed release capsule Take 1 capsule by mouth daily For four weeks after completing the 40mg tabs 6/18/18  Yes Tessy Camacho MD   gi cocktail (GI COCKTAIL) Take 15 mLs by mouth 2 times daily as needed (Abdominal pain) 7/26/18 8/5/18  Clovis Munoz DO   acyclovir (ZOVIRAX) 400 MG tablet TAKE 2 TABLETS 5 TIMES DAILY AT ONSET OF COLD SORE 4/11/18   Amos Mercado MD       Allergies:  Patient has no known allergies. Social History:  The patient currently lives at home with family. TOBACCO:   reports that he quit smoking about 28 years ago. His smoking use included Cigarettes. He has a 15.00 pack-year smoking history. He has never used smokeless tobacco.  ETOH:   reports that he does not drink alcohol. Family History:   Positive as follows:    Family History   Problem Relation Age of Onset    Heart Disease Father        REVIEW OF SYSTEMS:     Constitutional: Negative for fever   HENT: Negative for sore throat   Eyes: Negative for redness   Respiratory: Negative  for dyspnea, cough   Cardiovascular: Negative for chest pain   Gastrointestinal: + abdominal discomfort, bloating, poor appetite   Genitourinary: Negative for hematuria   Musculoskeletal: Negative for arthralgias   Skin: Negative for rash   Neurological: Negative for syncope   Hematological: Negative for adenopathy   Psychiatric/Behavorial: Negative for anxiety    PHYSICAL EXAM:    /81   Pulse 104   Temp 97.3 °F (36.3 °C)   Resp 16   Ht 6' (1.829 m)   Wt 218 lb (98.9 kg)   SpO2 95%   BMI 29.57 kg/m²   Gen: No distress. Alert. Pleasant,  male with jaundice  Eyes: PERRL. + scleral icterus. No conjunctival injection. ENT: No discharge. Pharynx clear. Neck: No JVD. No Carotid Bruit. Trachea midline. Resp: No accessory muscle use. No crackles. No wheezes. No rhonchi. CV: Regular rate. Regular rhythm. No murmur. No rub. Bilateral 2+ edema. Capillary Refill: Brisk,< 3 seconds   Peripheral Pulses: +2 palpable, equal bilaterally   GI: Non-tender. Non-distended. No masses. No organomegaly. Normal bowel sounds. No hernia. Skin: Warm and dry. No nodule on exposed extremities. No rash on exposed extremities.  Jaundiced, Midline lower abdominal incisional scar noted, incision noted in RLQ as well from appendectomy  M/S: No cyanosis. No joint deformity. No clubbing. Neuro: Awake. Grossly nonfocal    Psych: Oriented x 3. No anxiety or agitation. SABINO Cheema have reviewed the chart on [facility] and personally interviewed and examined patient, reviewed the data (labs and imaging) and after discussion with my PA formulated the plan. Agree with note with the following edits. HPI:     I reviewed the patient's Past Medical History, Past Surgical History, Medications, and Allergies. 72 y.o. male with a PMH of metastatic liver disease who presented from radiology due to elevated INR- supposed to have liver metastatic lesion bx today but held for high INR and being admitted to correct coagulopathy     Pt is being followed by Dr. Sofy Cedeño in oncology due to concern for metastatic disease with multiple nodules on the liver and in the lung  Pt was seen by his PCP Dr. Pete Cardoso on 7/2/2018 for epigastric abdominal pain and an U/S was ordered at that time which showed multiple nodules and concern for metastasis. He had an appointment for a CT Needle Biopsy on the day of admission however, at that time his INR was noted to be 3.18. Pt was admitted to med surg and hematology/oncology was consulted. General: elderly male,   Awake, alert and oriented.  Appears to be not in any distress  Mucous Membranes:  Pink ,+icteric  Neck: No JVD, no carotid bruit, no thyromegaly  Chest:  Clear to auscultation bilaterally, no added sounds  Cardiovascular:  RRR S1S2 heard, no murmurs or gallops  Abdomen:  Soft, undistended, non tender, no organomegaly, BS present  Extremities: 2+ salvador ankle edema Distal pulses well felt  Neurological : grossly normal              CBC:   Recent Labs      07/31/18   0748  07/31/18   1047   WBC   --   36.1*   HGB   --   16.9   HCT   --   51.9   MCV   --   87.4   PLT  208  190     BMP:   Recent Labs      07/31/18   1047   NA  131*

## 2018-07-31 NOTE — PROGRESS NOTES
Speech Language Pathology  Attempt Note      Order received and chart reviewed. Attempted Dysphagia evaluation, however pt was soundly sleeping. Family was in the room, stated pt had not slept well recently and they would prefer to let pt continue to sleep. SLP will re-attempt tomorrow after pt's procedure. Pt's family stated that he has had recent dysphagia for solid foods, indicating that solids will stop approximately midway through esophagus and come back up. Pt may also benefit from GI consult.      Thank you,  Joe Preciado M.A., [facility]  Speech Language Pathologist

## 2018-07-31 NOTE — PROGRESS NOTES
4 Eyes Skin Assessment     The patient is being assess for Admission      I agree that 2 RN's have performed a thorough Head to Toe Skin Assessment on the patient. ALL assessment sites listed below have been assessed. Areas assessed by both nurses:   [x]   Head, Face, and Ears   [x]   Shoulders, Back, and Chest, Abdomen  [x]   Arms, Elbows, and Hands   [x]   Coccyx, Sacrum, and Ischium  [x]   Legs, Feet, and Heels        Pt jaundiced,abrasion left elbow. Skin assessment negative.     **SHARE this note so that the co-signing nurse is able to place an eSignature**    Co-signer eSignature: Electronically signed by Anthony Mora RN on 7/31/18 at 12:50 PM    Does the Patient have Skin Breakdown?  none          Hakan Prevention initiated:  No   Wound Care Orders initiated:  No      WOC nurse consulted for Pressure Injury (Stage 3,4, Unstageable, DTI, NWPT, Complex wounds)and New or Established Ostomies:  No      Primary Nurse eSignature: Electronically signed by Ross Sanderson RN on 7/31/18 at 12:52 PM

## 2018-07-31 NOTE — PROGRESS NOTES
Transfer center notified of need for admission to [facility]. Awaiting call back.

## 2018-08-01 NOTE — PROGRESS NOTES
Shift report received from Xander Hoyt, [facility]. Patient is resting; wife at bedside. Call light in reach.

## 2018-08-01 NOTE — FLOWSHEET NOTE
07/31/18 2208   Vital Signs   Temp 97.4 °F (36.3 °C)   Pulse 101   Resp 18   /67   Level of Consciousness 0   MEWS Score 2   Shift assessment complete; see flow sheet. Scheduled medications administered; see MAR. Call light and bedside table within reach, bed in low locked position, side rails up 2/4, pt encouraged to call for assistance with ambulation or transfer. Pt denies further needs at this time. Nurse and staff will continue to monitor throughout shift.

## 2018-08-01 NOTE — PROGRESS NOTES
Spoke with Sterling Mehta, pt will have bx performed in the morning after another scheduled procedure. MD performing would like the last bag of FFP to be running during the procedure. MD would like current FFP to infuse and finish. Lab to draw PT/INR,   if improved, hang Vitamin K. MD would like the last bag of AM FFP to be running during the bx.  RN is to hold the scheduled 0500 FFP until labs result and a time is scheduled for the bx to coordinate time.       Writer phoned down to blood bank and updated them to not thaw the 0500 FFP until they hear from the morning RN

## 2018-08-01 NOTE — PROCEDURES
Radiology Procedure Note    Patient Name: Anuja Smith  Date of Birth 1945  MRN: [id number]    Procedure:CT guided liver biopsy    Pre-Procedure Diagnosis: liver mets    Post- Procedure Diagnosis: Same    Findings: The procedure was performed in the usual fashion, detailed in the full report provided separately. There were no immediate post-procedure complications.     Complications: None    Estimated Blood Loss: less than 50     Specimens: Was Obtained: 18g core biopsy      Electronically signed by Radha Galindo MD on 8/1/2018 at 11:39 AM

## 2018-08-01 NOTE — PROGRESS NOTES
Speech Language Pathology  Attempt Note    SLP reviewed chart and spoke with RN re:pt. RN stated that pt was NPO for a procedure later this morning. Will attempt to see pt later this afternoon if pt is appropriate and as ST schedule allows.        Thank you,   Diallo Hollis, [id number]  Speech-Language Pathologist

## 2018-08-01 NOTE — PROGRESS NOTES
IM Progress Note    Admit Date:  7/31/2018  1    Interval history:  abd pain overnight  Given 3 units FFP and 10 mg vit k , INR at 1.6    Subjective:    Mr. Delores Levin feels less abd pain today. Fatigue remains. Awaiting liver bx  Family at bedside    Objective:   /66   Pulse 90   Temp 93.3 °F (34.1 °C)   Resp 16   Ht 6' (1.829 m)   Wt 218 lb (98.9 kg)   SpO2 95%   BMI 29.57 kg/m²     Intake/Output Summary (Last 24 hours) at 08/01/18 1023  Last data filed at 08/01/18 0930   Gross per 24 hour   Intake             1136 ml   Output              325 ml   Net              811 ml       Physical Exam:  General: elderly male,   Awake, alert and oriented. Appears to be not in any distress  Mucous Membranes:  Pink ,+icteric  Neck: No JVD, no carotid bruit, no thyromegaly  Chest:  Clear to auscultation bilaterally, no added sounds  Cardiovascular:  RRR S1S2 heard, no murmurs or gallops  Abdomen:  Soft, undistended, non tender, no organomegaly, BS present  Extremities: 2+ salvador ankle edema Distal pulses well felt  Neurological : grossly normal           Medications:   Scheduled Medications:    sodium chloride flush  10 mL Intravenous 2 times per day    pantoprazole  40 mg Oral QAM AC     I   sodium chloride 125 mL/hr at 08/01/18 0755     sodium chloride flush, magnesium hydroxide, prochlorperazine, HYDROcodone 5 mg - acetaminophen    Lab Data:  Recent Labs      07/31/18   0748  07/31/18   1047  08/01/18   0030   WBC   --   36.1*  35.1*   HGB   --   16.9  14.0   HCT   --   51.9  42.6   MCV   --   87.4  86.4   PLT  208  190  165     Recent Labs      07/31/18   1047  08/01/18   0831   NA  131*  134*   K  5.2*  4.9   CL  92*  94*   CO2  19*  24   BUN  32*  34*   CREATININE  0.9  0.9     No results for input(s): CKTOTAL, CKMB, CKMBINDEX, TROPONINI in the last 72 hours.     Coagulation:   Lab Results   Component Value Date    INR 1.64 08/01/2018     Cardiac markers: No results found for: CKMB, CKTOTAL, TROPONINI, MYOGLOBIN      Lab Results   Component Value Date     (H) 08/01/2018     (H) 08/01/2018    ALKPHOS 518 (H) 08/01/2018    BILITOT 13.9 (H) 08/01/2018       Lab Results   Component Value Date    INR 1.64 (H) 08/01/2018    INR 3.48 (H) 07/31/2018    INR 3.18 (H) 07/31/2018    PROTIME 18.7 (H) 08/01/2018    PROTIME 39.7 (H) 07/31/2018    PROTIME 36.3 (H) 07/31/2018       Radiology    Pertinent previous results reviewed      CT abdomen Pevlis with IV Contrast 7/26/2018  Extensive hepatic and rupa metastatic disease as detailed above, not   substantially changed from the previous examination.       There is a 4 mm nodule in the left lung base.  Given the abdominal findings,   metastatic disease is a consideration.  Recommend continued follow-up.       Trace ascites.  Stranding in the right abdomen is nonspecific and could be   related to an inflammatory process (such as colitis) or possibly peritoneal   carcinomatosis.       Mild dilation of the renal collecting system, similar to previous   examination.  This could be related to an extrarenal pelvis or partial UPJ   obstruction.  There are a few nonobstructing calculi in the left renal   collecting system.       Cholelithiasis.       Colonic diverticulosis.             ASSESSMENT/PLAN:     Coagulopathy/ Abnormal LFTs  Liver Metastasis- unknown primary    - recent CT showed extensive hepatic and rupa metastatic disease, multiple 4 mm nodules in left lung base, tace ascites, stranding in the right abdomen    - INR on admission: 3.8, abn LFT with hyperbilirubinemia    - Admit to Med Surg for correction of coagulopathy  - 3units FFP given, vit K 10 mg given, inr down to 1.6, two more FFP today    - plan for biopsy today     Dysphagia  - SLP eval pending     Hyponatremia  - improved with IVF     Hyperkalemia  - 5.2  - likely sec to lactic acidosis   - resolved with IVF     AGMA  - AG of 20 from high lactate   - likely 2/2 to liver

## 2018-08-02 NOTE — PROGRESS NOTES
difficulty. Current Diet level:  Current Diet : Regular  Current Liquid Diet : Thin    Primary Complaint  Patient Complaint: Pt reported that he often gets food in esophageal area - reported via pointed to chest area. Pain:  Pain Assessment  Patient Currently in Pain: Denies    Reason for Referral  Dallas Diaz was referred for a bedside swallow evaluation to assess the efficiency of his swallow function, identify signs and symptoms of aspiration and make recommendations regarding safe dietary consistencies, effective compensatory strategies, and safe eating environment. Impression  Dysphagia Diagnosis: Mild to moderate oral stage dysphagia;Mild pharyngeal stage dysphagia  Dysphagia Impression : Pt with increased mastication time and solid residue noted x1 in pharynx. Dysphagia Outcome Severity Scale: Level 5: Mild dysphagia- Distant supervision. May need one diet consistency restricted     Pt greeted bedside while beginning to eat breakfast. Wife and other family members were present for evaluation. Pt reported that he has difficulty with swallowing bread, crackers, and cookies. He indicated that they frequently \"get stuck\" in mid-esophageal area (pointing to chest). He reported occasional choking and coughing but denied recurring instances, reported that it was only occasionally and most recent incident was approximately 6 weeks ago. Pt on room air and with SPO2 reading 95% prior to PO trials. Pt accepted trials of thin liquids via cup x5 and straw  x2. Pt with wet vocal quality x1 after one sips via cup. Pt was instructed to swallow again and wet sounding vocal quality was eliminated. Multiple (i.e. two) swallows were noted during sips to evacuate bolus fully from oral cavity. Pt with no coughing or throat clearing after thin liquids. SPO2 saturation remained stable between 95-96 during thin liquid trials.     Pt accepted trials of puree with with no observed clinical s/s of aspiration - no coughing, throat clearing, or wet vocal quality. Increased mastication time was noted with trials of regular consistency cracker and bread with syrup. Pt reported that he needed water to clear the bolus from pharynx. Pt accepted a trial of egg and reported feeling globus sensation in throat and independently took a sip of water to clear bolus. Belching was noted after trials and pt reported that he frequently belches after and throughout meals. Pt and family were instructed regarding safe swallow strategies and verbalized understanding. Treatment Plan  Requires SLP Intervention: Yes  Duration/Frequency of Treatment: 1-2x/FU for diet tolerance  D/C Recommendations: To be determined  Referral To: GI    Recommended Diet and Intervention  Diet Solids Recommendation: Dysphagia III Advanced  Liquid Consistency Recommendation: Thin  Recommended Form of Meds: PO  Therapeutic Interventions: Diet tolerance monitoring;Oral care; Therapeutic PO trials with SLP;Effortful swallow;Patient/Family education    Compensatory Swallowing Strategies  · Alternate solids and liquids  · Eat/Feed slowly  · Upright as possible for all oral intake  · Remain upright for 30-45 minutes after meals  · Small bites/sips  · Swallow 2 times per bite/sip    Treatment/Goals  Short-term Goals  Timeframe for Short-term Goals: 1-2 days  Goal 1: The pt will tolerate recommended diet without observed clinical signs of aspriation. Goal 2: The patient/caregiver will demonstrate understanding of compensatory strategies for improved swallow safety. Goal 3: The patient will tolerate instrumental swallowing procedure if appropriate. Long-term Goals  Timeframe for Long-term Goals: 1-2 days  Goal 1: The patient will tolerate the least restrictive diet without observed clinical signs of aspiration. General  Chart Reviewed: Yes  Subjective  Subjective: Pt alert and cooperative for therapy session. RN ok'd SLP entry to room.  Wife and family were MD    Education  Patient Education: Pt educated re: ST role in dysphagia, swallow mechanism, recommendations of evaluation, and safe swallow strategies. Patient Education Response: Verbalizes understanding      G-Code  SLP G-Codes  Functional Limitations: Swallowing  Swallow Current Status (): At least 20 percent but less than 40 percent impaired, limited or restricted  Swallow Goal Status ():  At least 20 percent but less than 40 percent impaired, limited or restricted         Therapy Time  SLP Individual Minutes  Time In: 0800  Time Out: 7059  Minutes: [address]  Speech-Language Pathologist

## 2018-08-02 NOTE — PROGRESS NOTES
Denies needs or pain at this time, family at bedside, will continue to monitor closely. Call light in reach.

## 2018-08-02 NOTE — PLAN OF CARE
Problem: Nutrition  Intervention: Swallowing evaluation  Bedside swallow evaluation completed this date. All further notes may be found in EMR. Sidney Knapp MA [address] [place] [address]  Speech-Language Pathologist    Intervention: Aspiration precautions  Bedside swallow evaluation completed this date. All further notes may be found in EMR.     Sidney Knapp, [id number]  Speech-Language Pathologist

## 2018-08-02 NOTE — PROGRESS NOTES
Smiling; reports feeling much better; stronger this morning. Spouse at bedside. Will continue to monitor.

## 2018-08-02 NOTE — PROGRESS NOTES
worsening       Leukocytosis  - 36.1  - unclear etiology .  Likely leukemoid  - no source of infection         DVT Prophylaxis: SCDs  Diet regular  Code Status: Full Code    Updated family   Plans for home care +/_ hospice care    Ness Pemberton MD 8/2/2018 7:45 AM

## 2018-08-03 NOTE — PROGRESS NOTES
4  /5 hams                  4  /5 hams                                         3  /5 iliopsoas              3 /5 iliopsoas       Lower Extremity Sensation    No apparent deficits. Lower Extremity Proprioception:   No apparent deficits. Coordination and Tone  WNL    Balance  Static Sitting:  good  Dynamic Sitting:  good  Static Standing: fair   Dynamic Standing: fair    Bed mobility    Supine to sit: SBA with HOB elevated  Sit to supine: NT  Scooting to head of bed: NT  Scooting in sitting: SBA  Rolling: SBA    Transfers    Sit to stand: CGA with verbal cues for hand placement  Stand to sit: CGA with verbal cues for hand placement  Bed to chair: CGA with RW    Gait    Ambulated with RW 76 feet with CGA  Gait deviations included: decreased rosita, slightly flexed posture    Activity Tolerance   Some MALHOTRA noted. Instructed in PLB. Spo2 96-98% on RA. Fatigued quickly with activity. Positioning Needs   In recliner chair with all needs in reach, alarm set. Exercises Initiated  - deferred due to level of fatigue    Patient/Family Education  Role of PT, d/c recommendation, activity promotion     Assessment of Deficits Walking [id number]  Pt demonstrated decreased activity tolerance, decreased safety awareness, decreased strength, decreased balance, and decreased independence with bed mobility, transfers and gait. Recommend home PT upon d/c from hospital with 24 hour assistance from family. Pt. Limited during evaluation by nothing  At end of evaluation, pt. In chair, alarm activated with call light and needs within reach. Goal(s) :   Walking [id number]  To be met in 3 visits:  1). Independent with LE Ex x 10 reps    To be met in 6 visits:  1). Supine to/from sit independently  2). Sit to/from stand supervision and safe technique  3). Bed to chair supervision with safe technique and use of RW  4).   Gait- ambulate 250 feet with AD with supervision and safe technique with multidirectional

## 2018-08-03 NOTE — PROGRESS NOTES
Shift report received from Greg Ariza, [facility]. Family at bedside, patient in bed with eyes closed.

## 2018-08-03 NOTE — CARE COORDINATION
INTERDISCIPLINARY PLAN OF CARE CONFERENCE    Date/Time: 8/1/2018 11:24 AM  Completed by: Brenda Price Case Management      Patient Name:  Shaggy Kemp  YOB: 1945  Admitting Diagnosis: Supratherapeutic INR [R79.1]  Supratherapeutic INR [R79.1]     Admit Date/Time:  7/31/2018 10:16 AM    Chart reviewed. Interdisciplinary team met to discuss patient progress and discharge plans. Disciplines included Case Management, Nursing, and Dietitian. Current Status: Stable    Anticipated Discharge Date: TBD  Expected D/C Disposition:  Home  Confirmed plan with patient and/or family Yes  Discharge Plan Comments: Reviewed chart. Noted pt out of room for liver Bx. Will cont plan for rturn home. Will cont to follow for [facility] [place].        Home O2 in place on admit: No  Pt informed of need to bring portable home O2 tank on day of discharge for nursing to connect prior to leaving:  Not Indicated  Verbalized agreement/Understanding:  Not Indicated
Palliative Care                                                                                                                                                                                                                                                                          Admit Date: 08/03/18    Advanced Directives: Patient with no Advanced Directives noted. Plan of Care/goals: Chart reviewed. Discussed with Hospitalist and CM. Patient with recent diagnosis of liver mets. Patient with abdominal pain and jaundice. Poor appetite. INR 3.8, Alt 155, . Hospitalist has met with family and discussed status and poor prognosis. Met with spouse and 3 grown children at bedside. Family very tearful. Patient is very clear his wants return home with family and hospice. Discussed hospice options and answered all questions. Given list of hospice agencies. Family would like [facility] to meet with them at [facility][1]. Tomeka Price to meet with family w/in the hour. Spiritual care in to see patient and family. Coffee cart available. Will continue to follow and support. Social/spiritual: Patient comes from home with spouse. Has family support. Plan: Patient to return home today with [facility]. Tomeka Price to set up DME prior to patient dc. [facility] to  patent at 1730 for transport. If problem with DME set up arises  - Hospice to call CM and/or nursing desk to cancel transport. Updated nursing and CM. Squad form/facesheet provided to . Unclear if pt has enough Percocet at home.  Patient feels Rosendo Leach would be beneficial. Nursing updated on plan and will inform Hospitalist.     Symptom Assessment:        Symptoms      Present  Y/N      Medications     Doses in last 24 hrs   Pain Denies  Norco 5-325 mg q4 prn 1023   N/V Denies Compazine 10 mg q 6hrs prn    Date of last BM 08/02/18 MOM    Anxiety/agitation Denies
will follow and assist with any needs that may arise.

## 2018-08-03 NOTE — PROGRESS NOTES
previous results reviewed      CT abdomen Pevlis with IV Contrast 7/26/2018  Extensive hepatic and rupa metastatic disease as detailed above, not   substantially changed from the previous examination.       There is a 4 mm nodule in the left lung base.  Given the abdominal findings,   metastatic disease is a consideration.  Recommend continued follow-up.       Trace ascites.  Stranding in the right abdomen is nonspecific and could be   related to an inflammatory process (such as colitis) or possibly peritoneal   carcinomatosis.       Mild dilation of the renal collecting system, similar to previous   examination.  This could be related to an extrarenal pelvis or partial UPJ   obstruction.  There are a few nonobstructing calculi in the left renal   collecting system.       Cholelithiasis.       Colonic diverticulosis.     path - adenocarcinoma -      ASSESSMENT/PLAN:     Metastatic adenocarcinoma- likely pancreatic or intestinal in origin    -- recent CT showed extensive hepatic and rupa metastatic disease, multiple 4 mm nodules in left lung base, tace ascites, stranding in the right abdomen  - progressive jaundice , poor appetite. Lactic acidosis, coagulopathy - very poor prognosis  - explained to family in detail  - pt to see Dr. Jacey Maloney on Monday to discuss about treatment options but hospice seems appropriate at this situation   - palliative care consult      Coagulopathy/ Abnormal LFTs  Liver Metastasis- unknown primary    - INR on admission: 3.8, abn LFT with hyperbilirubinemia    - Admit to Med Surg for correction of coagulopathy  - given FFP and vit K     Dysphagia  -diet per Speech therapy     Hyponatremia  - improved with IVF     Hyperkalemia  - 5.2  - likely sec to lactic acidosis   - resolved with IVF     AGMA  - AG of 20 from high lactate   - likely 2/2 to liver metastatic disease.  Doubt infectious etiology         Hyperbilirubinemia  - 2/2 liver mets  - 12.1 to 15, worsening       Leukocytosis  - 36.1  - unclear etiology .  Likely leukemoid  - no source of infection         DVT Prophylaxis: SCDs  Diet regular  Code Status: Full Code    Updated family   Plans for home care +/_ hospice care    Stacie Snyder MD 8/3/2018 9:26 AM

## 2018-08-03 NOTE — DISCHARGE SUMMARY
Name:  Alisa Pretty  Room:  8799/1353-69  MRN:    [id number]    IM Discharge Summary    Discharging Physician:  Chelsea Lund MD    Admit: 7/31/2018    Discharge:      PCP      Jazmin Quinn MD    Diagnoses and hospital course  this Admission      Metastatic adenocarcinoma- likely pancreatic or intestinal in origin     -- recent CT showed extensive hepatic and rupa metastatic disease, multiple 4 mm nodules in left lung base, tace ascites, stranding in the right abdomen  - progressive jaundice , poor appetite. Lactic acidosis, coagulopathy - very poor prognosis  - explained to family in detail  - pt to see Dr. Neena Patel on Monday to discuss about treatment options but hospice seems appropriate at this situation   - palliative care consulted and for home hospice today         Coagulopathy/ Abnormal LFTs  Liver Metastasis- unknown primary     - INR on admission: 3.8, abn LFT with hyperbilirubinemia     - Admit to Med Surg for correction of coagulopathy  - given FFP and vit K     Dysphagia  -diet per Speech therapy     Hyponatremia  - improved with IVF     Hyperkalemia  - 5.2  - likely sec to lactic acidosis   - resolved with IVF     AGMA  - AG of 20 from high lactate   - likely 2/2 to liver metastatic disease. Doubt infectious etiology           Hyperbilirubinemia  - 2/2 liver mets  - 12.1 to 15, worsening        Leukocytosis  - 36.1  - unclear etiology . Likely leukemoid  - no source of infection         Updated family    for home  hospice care today      HPI   72 y.o. male with a PMH of metastatic liver disease who presented from radiology due to elevated INR. Pt is being followed by Dr. Neena Patel in oncology due to concern for metastatic disease with multiple nodules on the liver and in the lung. Pt was seen by his PCP Dr. Nitish Edge on 7/2/2018 for epigastric abdominal pain and an U/S was ordered at that time which showed multiple nodules and concern for metastasis.  He had an appointment for a CT Needle Biopsy on the day of admission     Physical Exam at Discharge:    General: elderly male,   Awake, alert and oriented. Appears to be not in any distress  Mucous Membranes:  Pink ,+icteric           Radiology (Please Review Full Report for Details)       reviewed      CT abdomen Pevlis with IV Contrast 7/26/2018  Extensive hepatic and rupa metastatic disease as detailed above, not   substantially changed from the previous examination.       There is a 4 mm nodule in the left lung base.  Given the abdominal findings,   metastatic disease is a consideration.  Recommend continued follow-up.       Trace ascites.  Stranding in the right abdomen is nonspecific and could be   related to an inflammatory process (such as colitis) or possibly peritoneal   carcinomatosis.       Mild dilation of the renal collecting system, similar to previous   examination.  This could be related to an extrarenal pelvis or partial UPJ   obstruction.  There are a few nonobstructing calculi in the left renal   collecting system.       Cholelithiasis.       Colonic diverticulosis.     path - adenocarcinoma -            Consults:    1. Palliative care  2.  Hospice   3. radiology                  Recent Labs      08/01/18   0030   WBC  35.1*   HGB  14.0   HCT  42.6   MCV  86.4   PLT  165       Recent Labs      08/01/18   0831  08/03/18   0603   NA  134*  132*   K  4.9  5.1   CL  94*  91*   CO2  24  19*   BUN  34*  45*   CREATININE  0.9  0.9       CBC:  Lab Results   Component Value Date    WBC 35.1 08/01/2018    HGB 14.0 08/01/2018    HCT 42.6 08/01/2018    MCV 86.4 08/01/2018     08/01/2018    NEUTOPHILPCT 51.0 08/01/2018    LYMPHOPCT 3.0 08/01/2018    MONOPCT 8.0 08/01/2018    EOSPCT 1.3 12/08/2010    BASOPCT 0.0 08/01/2018    NEUTROABS 25.3 08/01/2018    LYMPHSABS 1.1 08/01/2018    MONOSABS 2.8 08/01/2018    EOSABS 6.0 08/01/2018    BASOSABS 0.0 08/01/2018     BNP:   Lab Results   Component Value Date     08/03/2018    K 5.1 08/03/2018    K 5.2

## 2018-08-03 NOTE — PROGRESS NOTES
Speech Language Pathology  Attempt Note    SLP reviewed chart and spoke with RN re:pt. RN stated that pt and family were tearful and discussing discharge options at this time and therefore not appropriate to be seen. SLP will attempt to see pt later this afternoon as ST schedule allows.      Thank you,  Lasha Brooks, [address] #[id number]  Speech-Language Pathologist

## 2018-08-03 NOTE — PROGRESS NOTES
ROM limitation. Upper Extremity Sensation:  No apparent deficits    Upper Extremity Proprioception:  No apparent deficits    Skin Integrity:  No apparent deficits    Coordination and Tone:  No apparent deficits     Activity Tolerance:   Seated at EOB: Pt reports he feels weak with no dizziness or headaches. Balance:    Static Sitting: Good (+)   Dynamic Sitting: Good with bilateral hand held support on bed   Static Standing: Good  Dynamic Standing: Good    Bed mobility:    Supine to sit: SBA with Hob elevated   Sit to supine: DNT, pt up in chair   Scooting in sitting: Supervision    Transfers:    Sit to stand: CGA with VC for hand placement (from EOB and BSC to RW)   Stand to sit: CGA with VC for hand placement (from RW to [facility] and bed side chair)   Pt completed functional mobility of household distance in preparation for standing ADL/IADLs this date with CGA and RW. ADLs: Pt politely declining further ADLs 2/2 fatigue from functional mobility. Toileting: SBA with VC for hand placement and utilization of RW for transfer     Positioning Needs: Pt in bedside chair with needs in place and family present. Patient/Family Education:  Role of OT, POC, d/c recommendations  Handouts provided on: staying in home as you age, fall prevention  HEP provided including:   Shoulder rolls: x10  AROM flexion shoulder: x10 bilaterally   Pelvic tilt, seated: x10  Upper trunk rotations: x10  Seated punches: 10x bilaterally   Alternating overhead press: 10x bilaterally     Family Present: children and wife     Staff Transfer Message (as written in room): Up with assist of 1 person     Assessment of Deficits: Self Care [id number]  Pt demonstrated decreased activity tolerance, decreased strength, and increased fatigue. Pt. Limited during evaluation by fatigue. Pt will benefit from skilled OT while in the hospital and upon d/c in order to facilitate progress toward safe, baseline functioning. At end of evaluation, pt.  In bed side chair, alarm activated with call light and needs within reach. RN notified. Goals :   Self Care [id number]  To be met in 3 Visits:  1). Indep with UE ex x 10 reps    To be met in 5 Visits:  1). Supine to Sit: Indpt  2). Bed to Chair/BSC: Indpt  3). Upper Body Bathing: Indpt  4). Lower Body Bathing: Indpt  5). Upper Body Dressing: Indpt  6). Lower Body Dressing: Indpt   7). Pt to margie UE exs [id number]    Rehabilitation Potential:  Good for goals listed above. Strengths for achieving goals include: family support, motivation  Barriers to achieving goals include: current medical diagnosis      Plan: To be seen 3-5x/week while in acute care setting for therapeutic exercises, bed mobility, transfers, dressing, bathing, family/patient education with adaptive equipment, breathing technique instruction.      Timed Code Treatment Minutes: 24 min    Total Treatment Time:   34   minutes    Signature and License #    Kwesi Casper, [address] [facility], OTR/L   [id number]

## 2021-11-22 NOTE — TELEPHONE ENCOUNTER
Patient states he is having some back pain now and wanted to see if you could give him something for the pain Immediate family member